# Patient Record
Sex: FEMALE | Race: WHITE | NOT HISPANIC OR LATINO
[De-identification: names, ages, dates, MRNs, and addresses within clinical notes are randomized per-mention and may not be internally consistent; named-entity substitution may affect disease eponyms.]

---

## 2017-01-13 ENCOUNTER — RECORD ABSTRACTING (OUTPATIENT)
Age: 27
End: 2017-01-13

## 2017-01-13 DIAGNOSIS — Z12.4 ENCOUNTER FOR SCREENING FOR MALIGNANT NEOPLASM OF CERVIX: ICD-10-CM

## 2017-01-13 DIAGNOSIS — Z98.891 HISTORY OF UTERINE SCAR FROM PREVIOUS SURGERY: ICD-10-CM

## 2017-01-13 DIAGNOSIS — Z78.9 OTHER SPECIFIED HEALTH STATUS: ICD-10-CM

## 2017-03-22 ENCOUNTER — RX RENEWAL (OUTPATIENT)
Age: 27
End: 2017-03-22

## 2017-03-27 ENCOUNTER — RESULT REVIEW (OUTPATIENT)
Age: 27
End: 2017-03-27

## 2017-03-27 ENCOUNTER — APPOINTMENT (OUTPATIENT)
Dept: OBGYN | Facility: CLINIC | Age: 27
End: 2017-03-27

## 2017-03-27 VITALS
WEIGHT: 152 LBS | DIASTOLIC BLOOD PRESSURE: 74 MMHG | HEIGHT: 65 IN | SYSTOLIC BLOOD PRESSURE: 110 MMHG | BODY MASS INDEX: 25.33 KG/M2

## 2017-04-11 ENCOUNTER — APPOINTMENT (OUTPATIENT)
Dept: ANTEPARTUM | Facility: CLINIC | Age: 27
End: 2017-04-11

## 2017-04-11 ENCOUNTER — RESULT REVIEW (OUTPATIENT)
Age: 27
End: 2017-04-11

## 2017-04-12 LAB
ABO + RH BLD: NORMAL
BASOPHILS # BLD: 0.02 TH/MM3
BASOPHILS NFR BLD: 0.2 %
BLD GP AB SCN SERPL QL: NEGATIVE
EOSINOPHIL # BLD: 0.1 TH/MM3
EOSINOPHIL NFR BLD: 1 %
ERYTHROCYTE [DISTWIDTH] IN BLOOD BY AUTOMATED COUNT: 14.9 %
GRANULOCYTES # BLD: 7.73 TH/MM3
GRANULOCYTES NFR BLD: 76.3 %
HCT VFR BLD AUTO: 38.9 %
HGB BLD-MCNC: 12.7 G/DL
IMM GRANULOCYTES # BLD: 0.02 TH/MM3
IMM GRANULOCYTES NFR BLD: 0.2 %
LYMPHOCYTES # BLD: 1.69 TH/MM3
LYMPHOCYTES NFR BLD: 16.7 %
MCH RBC QN AUTO: 29.4 PG
MCHC RBC AUTO-ENTMCNC: 32.6 G/DL
MCV RBC AUTO: 90 FL
MONOCYTES # BLD: 0.57 TH/MM3
MONOCYTES NFR BLD: 5.6 %
PLATELET # BLD: 273 TH/MM3
PMV BLD AUTO: 10.9 FL
RBC # BLD AUTO: 4.32 MIL/MM3
RPR SER QL: NONREACTIVE
WBC # BLD: 10.13 TH/MM3

## 2017-04-13 LAB
BACTERIA UR CULT: NORMAL
HBV SURFACE AG SER-ACNC: NONREACTIVE
RUBV IGG SER-ACNC: 3 INDEX
RUBV IGG SER-IMP: POSITIVE
VZV IGG FLD QL IA: 1224 INDEX
VZV IGG FLD QL IA: POSITIVE

## 2017-04-17 LAB
C TRACH RRNA SPEC QL NAA+PROBE: NOT DETECTED
HGB A MFR BLD: 96.7 %
HGB A2 MFR BLD: 3 %
HGB F MFR BLD: 0.3 %
HGB FRACT BLD ELPH CITRATE-IMP: NORMAL
N GONORRHOEA RRNA SPEC QL NAA+PROBE: NOT DETECTED

## 2017-04-19 ENCOUNTER — RESULT REVIEW (OUTPATIENT)
Age: 27
End: 2017-04-19

## 2017-04-19 LAB
AR GENE MUT ANL BLD/T: NORMAL
HIV1+2 AB SPEC QL IA.RAPID: NONREACTIVE
LEAD BLD-MCNC: <1 MCG/DL
SMN1(NORTH): NORMAL
SMN2(NORTH): NORMAL
SPECIMEN SOURCE: NORMAL
TECHNICAL RESULTS(NORTH): NORMAL

## 2017-04-27 ENCOUNTER — RESULT CHARGE (OUTPATIENT)
Age: 27
End: 2017-04-27

## 2017-04-27 ENCOUNTER — APPOINTMENT (OUTPATIENT)
Dept: OBGYN | Facility: CLINIC | Age: 27
End: 2017-04-27

## 2017-04-27 VITALS
BODY MASS INDEX: 26.66 KG/M2 | WEIGHT: 160 LBS | SYSTOLIC BLOOD PRESSURE: 118 MMHG | DIASTOLIC BLOOD PRESSURE: 60 MMHG | HEIGHT: 65 IN

## 2017-04-27 LAB
BILIRUB UR QL STRIP: NORMAL
CLARITY UR: CLEAR
COLLECTION METHOD: NORMAL
GLUCOSE UR-MCNC: NORMAL
HCG UR QL: NORMAL EU/DL
HGB UR QL STRIP.AUTO: NORMAL
KETONES UR-MCNC: NORMAL
LEUKOCYTE ESTERASE UR QL STRIP: NORMAL
NITRITE UR QL STRIP: NORMAL
PH UR STRIP: NORMAL
PROT UR STRIP-MCNC: NORMAL
SP GR UR STRIP: NORMAL

## 2017-05-01 ENCOUNTER — APPOINTMENT (OUTPATIENT)
Dept: OBGYN | Facility: CLINIC | Age: 27
End: 2017-05-01

## 2017-05-22 ENCOUNTER — APPOINTMENT (OUTPATIENT)
Dept: OBGYN | Facility: CLINIC | Age: 27
End: 2017-05-22

## 2017-05-22 VITALS — BODY MASS INDEX: 27.29 KG/M2 | WEIGHT: 164 LBS

## 2017-05-31 ENCOUNTER — RESULT REVIEW (OUTPATIENT)
Age: 27
End: 2017-05-31

## 2017-06-12 ENCOUNTER — APPOINTMENT (OUTPATIENT)
Dept: ANTEPARTUM | Facility: CLINIC | Age: 27
End: 2017-06-12

## 2017-06-14 LAB — MATERNAL ALPHA FETO (NORTH): NORMAL

## 2017-06-19 ENCOUNTER — APPOINTMENT (OUTPATIENT)
Dept: OBGYN | Facility: CLINIC | Age: 27
End: 2017-06-19

## 2017-06-19 ENCOUNTER — OUTPATIENT (OUTPATIENT)
Dept: OUTPATIENT SERVICES | Facility: HOSPITAL | Age: 27
LOS: 1 days | Discharge: HOME | End: 2017-06-19

## 2017-06-19 VITALS — DIASTOLIC BLOOD PRESSURE: 60 MMHG | WEIGHT: 169 LBS | SYSTOLIC BLOOD PRESSURE: 100 MMHG | BODY MASS INDEX: 28.12 KG/M2

## 2017-06-28 DIAGNOSIS — Z33.1 PREGNANT STATE, INCIDENTAL: ICD-10-CM

## 2017-07-17 ENCOUNTER — APPOINTMENT (OUTPATIENT)
Dept: OBGYN | Facility: CLINIC | Age: 27
End: 2017-07-17

## 2017-07-20 ENCOUNTER — APPOINTMENT (OUTPATIENT)
Dept: OBGYN | Facility: CLINIC | Age: 27
End: 2017-07-20

## 2017-07-20 ENCOUNTER — OUTPATIENT (OUTPATIENT)
Dept: OUTPATIENT SERVICES | Facility: HOSPITAL | Age: 27
LOS: 1 days | Discharge: HOME | End: 2017-07-20

## 2017-07-20 ENCOUNTER — RESULT CHARGE (OUTPATIENT)
Age: 27
End: 2017-07-20

## 2017-07-20 VITALS — WEIGHT: 174.5 LBS | BODY MASS INDEX: 29.04 KG/M2

## 2017-07-20 LAB
BILIRUB UR QL STRIP: NORMAL
CLARITY UR: CLEAR
COLLECTION METHOD: NORMAL
GLUCOSE UR-MCNC: NORMAL
HCG UR QL: 0.2 EU/DL
HGB UR QL STRIP.AUTO: NORMAL
KETONES UR-MCNC: NORMAL
LEUKOCYTE ESTERASE UR QL STRIP: NORMAL
NITRITE UR QL STRIP: NORMAL
PH UR STRIP: 6.5
PROT UR STRIP-MCNC: NORMAL
SP GR UR STRIP: 1.01

## 2017-08-03 DIAGNOSIS — Z33.1 PREGNANT STATE, INCIDENTAL: ICD-10-CM

## 2017-08-07 ENCOUNTER — RESULT REVIEW (OUTPATIENT)
Age: 27
End: 2017-08-07

## 2017-08-07 LAB
BASOPHILS # BLD: 0.01 TH/MM3
BASOPHILS NFR BLD: 0.1 %
DIFFERENTIAL METHOD BLD: NORMAL
EOSINOPHIL # BLD: 0.11 TH/MM3
EOSINOPHIL NFR BLD: 1.1 %
ERYTHROCYTE [DISTWIDTH] IN BLOOD BY AUTOMATED COUNT: 15 %
GLUCOSE 1H P GLC SERPL-MCNC: 86 MG/DL
GRANULOCYTES # BLD: 7.88 TH/MM3
GRANULOCYTES NFR BLD: 77.5 %
HCT VFR BLD AUTO: 37.3 %
HGB BLD-MCNC: 11.8 G/DL
IMM GRANULOCYTES # BLD: 0.09 TH/MM3
IMM GRANULOCYTES NFR BLD: 0.9 %
LYMPHOCYTES # BLD: 1.16 TH/MM3
LYMPHOCYTES NFR BLD: 11.4 %
MCH RBC QN AUTO: 30 PG
MCHC RBC AUTO-ENTMCNC: 31.6 G/DL
MCV RBC AUTO: 94.9 FL
MONOCYTES # BLD: 0.92 TH/MM3
MONOCYTES NFR BLD: 9 %
PLATELET # BLD: 240 TH/MM3
PMV BLD AUTO: 10.7 FL
RBC # BLD AUTO: 3.93 MIL/MM3
WBC # BLD: 10.17 TH/MM3

## 2017-08-28 ENCOUNTER — OUTPATIENT (OUTPATIENT)
Dept: OUTPATIENT SERVICES | Facility: HOSPITAL | Age: 27
LOS: 1 days | Discharge: HOME | End: 2017-08-28

## 2017-08-28 ENCOUNTER — APPOINTMENT (OUTPATIENT)
Dept: OBGYN | Facility: CLINIC | Age: 27
End: 2017-08-28

## 2017-08-28 ENCOUNTER — RESULT CHARGE (OUTPATIENT)
Age: 27
End: 2017-08-28

## 2017-08-28 VITALS
DIASTOLIC BLOOD PRESSURE: 60 MMHG | WEIGHT: 177.44 LBS | SYSTOLIC BLOOD PRESSURE: 100 MMHG | BODY MASS INDEX: 29.56 KG/M2 | HEIGHT: 65 IN

## 2017-08-28 LAB
BILIRUB UR QL STRIP: NORMAL
CLARITY UR: CLEAR
COLLECTION METHOD: NORMAL
GLUCOSE UR-MCNC: NORMAL
HCG UR QL: 0.2 EU/DL
HGB UR QL STRIP.AUTO: NORMAL
KETONES UR-MCNC: NORMAL
LEUKOCYTE ESTERASE UR QL STRIP: NORMAL
NITRITE UR QL STRIP: NORMAL
PH UR STRIP: 6
PROT UR STRIP-MCNC: NORMAL
SP GR UR STRIP: 1.02

## 2017-08-28 RX ORDER — VITAMIN A ACETATE, .BETA.-CAROTENE, ASCORBIC ACID, CHOLECALCIFEROL, .ALPHA.-TOCOPHEROL ACETATE, DL-, THIAMINE MONONITRATE, RIBOFLAVIN, NIACINAMIDE, PYRIDOXINE HYDROCHLORIDE, FOLIC ACID, CYANOCOBALAMIN, CALCIUM CARBONATE, FERROUS FUMARATE, ZINC OXIDE, CUPRIC OXIDE 3080; 920; 120; 400; 22; 1.84; 3; 20; 10; 1; 12; 200; 27; 25; 2 [IU]/1; [IU]/1; MG/1; [IU]/1; MG/1; MG/1; MG/1; MG/1; MG/1; MG/1; UG/1; MG/1; MG/1; MG/1; MG/1
27-1 TABLET, FILM COATED ORAL DAILY
Qty: 60 | Refills: 2 | Status: ACTIVE | COMMUNITY
Start: 2017-03-22 | End: 1900-01-01

## 2017-09-07 ENCOUNTER — APPOINTMENT (OUTPATIENT)
Dept: ANTEPARTUM | Facility: CLINIC | Age: 27
End: 2017-09-07

## 2017-09-07 DIAGNOSIS — Z3A.32 32 WEEKS GESTATION OF PREGNANCY: ICD-10-CM

## 2017-09-07 DIAGNOSIS — Z33.1 PREGNANT STATE, INCIDENTAL: ICD-10-CM

## 2017-09-07 DIAGNOSIS — O26.843 UTERINE SIZE-DATE DISCREPANCY, THIRD TRIMESTER: ICD-10-CM

## 2017-09-07 DIAGNOSIS — O34.219 MATERNAL CARE FOR UNSPECIFIED TYPE SCAR FROM PREVIOUS CESAREAN DELIVERY: ICD-10-CM

## 2017-09-11 ENCOUNTER — RESULT CHARGE (OUTPATIENT)
Age: 27
End: 2017-09-11

## 2017-09-11 ENCOUNTER — OUTPATIENT (OUTPATIENT)
Dept: OUTPATIENT SERVICES | Facility: HOSPITAL | Age: 27
LOS: 1 days | Discharge: HOME | End: 2017-09-11

## 2017-09-11 ENCOUNTER — APPOINTMENT (OUTPATIENT)
Dept: OBGYN | Facility: CLINIC | Age: 27
End: 2017-09-11

## 2017-09-11 VITALS — BODY MASS INDEX: 29.29 KG/M2 | WEIGHT: 176 LBS | DIASTOLIC BLOOD PRESSURE: 60 MMHG | SYSTOLIC BLOOD PRESSURE: 102 MMHG

## 2017-09-11 LAB
BILIRUB UR QL STRIP: NORMAL
CLARITY UR: NORMAL
COLLECTION METHOD: NORMAL
GLUCOSE UR-MCNC: NORMAL
HCG UR QL: 0.2 EU/DL
HGB UR QL STRIP.AUTO: NORMAL
KETONES UR-MCNC: NORMAL
LEUKOCYTE ESTERASE UR QL STRIP: NORMAL
NITRITE UR QL STRIP: NORMAL
PH UR STRIP: 6
PROT UR STRIP-MCNC: NORMAL
SP GR UR STRIP: 1.01

## 2017-09-25 ENCOUNTER — RESULT CHARGE (OUTPATIENT)
Age: 27
End: 2017-09-25

## 2017-09-25 ENCOUNTER — OUTPATIENT (OUTPATIENT)
Dept: OUTPATIENT SERVICES | Facility: HOSPITAL | Age: 27
LOS: 1 days | Discharge: HOME | End: 2017-09-25

## 2017-09-25 ENCOUNTER — RESULT REVIEW (OUTPATIENT)
Age: 27
End: 2017-09-25

## 2017-09-25 ENCOUNTER — APPOINTMENT (OUTPATIENT)
Dept: OBGYN | Facility: CLINIC | Age: 27
End: 2017-09-25

## 2017-09-25 VITALS
DIASTOLIC BLOOD PRESSURE: 62 MMHG | BODY MASS INDEX: 29.99 KG/M2 | SYSTOLIC BLOOD PRESSURE: 110 MMHG | HEIGHT: 65 IN | WEIGHT: 180.03 LBS

## 2017-09-25 LAB
BILIRUB UR QL STRIP: NORMAL
CLARITY UR: CLEAR
COLLECTION METHOD: NORMAL
GLUCOSE UR-MCNC: NORMAL
HCG UR QL: 0.2 EU/DL
HGB UR QL STRIP.AUTO: NORMAL
KETONES UR-MCNC: NORMAL
LEUKOCYTE ESTERASE UR QL STRIP: NORMAL
NITRITE UR QL STRIP: NORMAL
PH UR STRIP: 6
PROT UR STRIP-MCNC: NORMAL
SP GR UR STRIP: 1.01

## 2017-09-26 LAB
BASOPHILS # BLD: 0.01 TH/MM3
BASOPHILS NFR BLD: 0.1 %
DIFFERENTIAL METHOD BLD: NORMAL
EOSINOPHIL # BLD: 0.08 TH/MM3
EOSINOPHIL NFR BLD: 0.7 %
ERYTHROCYTE [DISTWIDTH] IN BLOOD BY AUTOMATED COUNT: 15.4 %
GRANULOCYTES # BLD: 9.14 TH/MM3
GRANULOCYTES NFR BLD: 78.1 %
HCT VFR BLD AUTO: 37.2 %
HGB BLD-MCNC: 12.1 G/DL
IMM GRANULOCYTES # BLD: 0.11 TH/MM3
IMM GRANULOCYTES NFR BLD: 0.9 %
LYMPHOCYTES # BLD: 1.5 TH/MM3
LYMPHOCYTES NFR BLD: 12.8 %
MCH RBC QN AUTO: 29.7 PG
MCHC RBC AUTO-ENTMCNC: 32.5 G/DL
MCV RBC AUTO: 91.2 FL
MONOCYTES # BLD: 0.87 TH/MM3
MONOCYTES NFR BLD: 7.4 %
PLATELET # BLD: 210 TH/MM3
PMV BLD AUTO: 10.7 FL
RBC # BLD AUTO: 4.08 MIL/MM3
WBC # BLD: 11.71 TH/MM3

## 2017-09-27 LAB — HIV1+2 AB SPEC QL IA.RAPID: NONREACTIVE

## 2017-09-28 LAB
GP B STREP DNA SPEC QL NAA+PROBE: NORMAL
S PYO DNA THROAT QL NAA+PROBE: NOT DETECTED
SPECIMEN SOURCE: NORMAL

## 2017-10-02 ENCOUNTER — OUTPATIENT (OUTPATIENT)
Dept: OUTPATIENT SERVICES | Facility: HOSPITAL | Age: 27
LOS: 1 days | Discharge: HOME | End: 2017-10-02

## 2017-10-02 ENCOUNTER — APPOINTMENT (OUTPATIENT)
Dept: OBGYN | Facility: CLINIC | Age: 27
End: 2017-10-02

## 2017-10-02 VITALS — SYSTOLIC BLOOD PRESSURE: 110 MMHG | DIASTOLIC BLOOD PRESSURE: 70 MMHG | BODY MASS INDEX: 30.29 KG/M2 | WEIGHT: 182 LBS

## 2017-10-02 LAB
BILIRUB UR QL STRIP: NEGATIVE
C TRACH RRNA SPEC QL NAA+PROBE: NOT DETECTED
CLARITY UR: CLEAR
COLLECTION METHOD: NORMAL
GLUCOSE UR-MCNC: NEGATIVE
HCG UR QL: NEGATIVE EU/DL
HGB UR QL STRIP.AUTO: NEGATIVE
KETONES UR-MCNC: NEGATIVE
LEUKOCYTE ESTERASE UR QL STRIP: NEGATIVE
N GONORRHOEA RRNA SPEC QL NAA+PROBE: NOT DETECTED
NITRITE UR QL STRIP: NEGATIVE
PH UR STRIP: 6.5
PROT UR STRIP-MCNC: NEGATIVE
SP GR UR STRIP: 1.02

## 2017-10-11 ENCOUNTER — APPOINTMENT (OUTPATIENT)
Dept: OBGYN | Facility: CLINIC | Age: 27
End: 2017-10-11

## 2017-10-11 ENCOUNTER — OUTPATIENT (OUTPATIENT)
Dept: OUTPATIENT SERVICES | Facility: HOSPITAL | Age: 27
LOS: 1 days | Discharge: HOME | End: 2017-10-11

## 2017-10-11 ENCOUNTER — RESULT CHARGE (OUTPATIENT)
Age: 27
End: 2017-10-11

## 2017-10-11 VITALS
WEIGHT: 179.25 LBS | SYSTOLIC BLOOD PRESSURE: 110 MMHG | DIASTOLIC BLOOD PRESSURE: 70 MMHG | BODY MASS INDEX: 29.87 KG/M2 | HEIGHT: 65 IN

## 2017-10-16 ENCOUNTER — APPOINTMENT (OUTPATIENT)
Dept: OBGYN | Facility: CLINIC | Age: 27
End: 2017-10-16

## 2017-10-16 ENCOUNTER — OUTPATIENT (OUTPATIENT)
Dept: OUTPATIENT SERVICES | Facility: HOSPITAL | Age: 27
LOS: 1 days | Discharge: HOME | End: 2017-10-16

## 2017-10-16 VITALS — DIASTOLIC BLOOD PRESSURE: 60 MMHG | SYSTOLIC BLOOD PRESSURE: 110 MMHG | BODY MASS INDEX: 29.79 KG/M2 | WEIGHT: 179 LBS

## 2017-10-16 LAB
BILIRUB UR QL STRIP: NEGATIVE
CLARITY UR: CLEAR
COLLECTION METHOD: NORMAL
GLUCOSE UR-MCNC: NEGATIVE
HCG UR QL: NEGATIVE EU/DL
HGB UR QL STRIP.AUTO: NEGATIVE
KETONES UR-MCNC: NEGATIVE
LEUKOCYTE ESTERASE UR QL STRIP: NORMAL
NITRITE UR QL STRIP: NEGATIVE
PH UR STRIP: 7
PROT UR STRIP-MCNC: NEGATIVE
SP GR UR STRIP: 1.02

## 2017-10-23 ENCOUNTER — APPOINTMENT (OUTPATIENT)
Dept: OBGYN | Facility: CLINIC | Age: 27
End: 2017-10-23

## 2017-10-23 ENCOUNTER — OUTPATIENT (OUTPATIENT)
Dept: OUTPATIENT SERVICES | Facility: HOSPITAL | Age: 27
LOS: 1 days | Discharge: HOME | End: 2017-10-23

## 2017-10-23 VITALS — WEIGHT: 180 LBS | DIASTOLIC BLOOD PRESSURE: 60 MMHG | SYSTOLIC BLOOD PRESSURE: 90 MMHG | BODY MASS INDEX: 29.95 KG/M2

## 2017-10-23 LAB
BILIRUB UR QL STRIP: NEGATIVE
CLARITY UR: CLEAR
COLLECTION METHOD: NORMAL
GLUCOSE UR-MCNC: NEGATIVE
HCG UR QL: NEGATIVE EU/DL
HGB UR QL STRIP.AUTO: NORMAL
KETONES UR-MCNC: NEGATIVE
LEUKOCYTE ESTERASE UR QL STRIP: NEGATIVE
NITRITE UR QL STRIP: NEGATIVE
PH UR STRIP: 6.5
PROT UR STRIP-MCNC: NEGATIVE
SP GR UR STRIP: 1.03

## 2017-10-30 ENCOUNTER — APPOINTMENT (OUTPATIENT)
Dept: OBGYN | Facility: CLINIC | Age: 27
End: 2017-10-30

## 2017-10-30 ENCOUNTER — OUTPATIENT (OUTPATIENT)
Dept: OUTPATIENT SERVICES | Facility: HOSPITAL | Age: 27
LOS: 1 days | Discharge: HOME | End: 2017-10-30

## 2017-10-30 ENCOUNTER — APPOINTMENT (OUTPATIENT)
Dept: ANTEPARTUM | Facility: CLINIC | Age: 27
End: 2017-10-30

## 2017-10-30 VITALS — SYSTOLIC BLOOD PRESSURE: 100 MMHG | WEIGHT: 180 LBS | BODY MASS INDEX: 29.95 KG/M2 | DIASTOLIC BLOOD PRESSURE: 60 MMHG

## 2017-10-30 DIAGNOSIS — Z33.1 PREGNANT STATE, INCIDENTAL: ICD-10-CM

## 2017-10-30 DIAGNOSIS — O48.0 POST-TERM PREGNANCY: ICD-10-CM

## 2017-11-02 ENCOUNTER — INPATIENT (INPATIENT)
Facility: HOSPITAL | Age: 27
LOS: 1 days | Discharge: HOME | End: 2017-11-04
Attending: OBSTETRICS & GYNECOLOGY | Admitting: OBSTETRICS & GYNECOLOGY

## 2017-11-06 ENCOUNTER — APPOINTMENT (OUTPATIENT)
Dept: ANTEPARTUM | Facility: CLINIC | Age: 27
End: 2017-11-06

## 2017-11-06 ENCOUNTER — APPOINTMENT (OUTPATIENT)
Dept: OBGYN | Facility: CLINIC | Age: 27
End: 2017-11-06

## 2017-11-08 DIAGNOSIS — O48.0 POST-TERM PREGNANCY: ICD-10-CM

## 2017-11-08 DIAGNOSIS — Z34.93 ENCOUNTER FOR SUPERVISION OF NORMAL PREGNANCY, UNSPECIFIED, THIRD TRIMESTER: ICD-10-CM

## 2017-11-08 DIAGNOSIS — B00.9 HERPESVIRAL INFECTION, UNSPECIFIED: ICD-10-CM

## 2017-11-08 DIAGNOSIS — Z3A.40 40 WEEKS GESTATION OF PREGNANCY: ICD-10-CM

## 2018-06-12 ENCOUNTER — TRANSCRIPTION ENCOUNTER (OUTPATIENT)
Age: 28
End: 2018-06-12

## 2018-08-09 ENCOUNTER — APPOINTMENT (OUTPATIENT)
Dept: OBGYN | Facility: CLINIC | Age: 28
End: 2018-08-09

## 2018-08-30 ENCOUNTER — APPOINTMENT (OUTPATIENT)
Dept: OBGYN | Facility: CLINIC | Age: 28
End: 2018-08-30

## 2018-08-30 ENCOUNTER — OUTPATIENT (OUTPATIENT)
Dept: OUTPATIENT SERVICES | Facility: HOSPITAL | Age: 28
LOS: 1 days | Discharge: HOME | End: 2018-08-30

## 2018-08-30 VITALS — DIASTOLIC BLOOD PRESSURE: 60 MMHG | SYSTOLIC BLOOD PRESSURE: 100 MMHG | WEIGHT: 170 LBS | BODY MASS INDEX: 28.29 KG/M2

## 2018-08-30 DIAGNOSIS — N83.209 UNSPECIFIED OVARIAN CYST, UNSPECIFIED SIDE: ICD-10-CM

## 2018-08-30 DIAGNOSIS — Z01.419 ENCOUNTER FOR GYNECOLOGICAL EXAMINATION (GENERAL) (ROUTINE) WITHOUT ABNORMAL FINDINGS: ICD-10-CM

## 2018-08-30 DIAGNOSIS — N92.6 IRREGULAR MENSTRUATION, UNSPECIFIED: ICD-10-CM

## 2018-09-14 ENCOUNTER — APPOINTMENT (OUTPATIENT)
Dept: ANTEPARTUM | Facility: CLINIC | Age: 28
End: 2018-09-14

## 2018-09-27 ENCOUNTER — APPOINTMENT (OUTPATIENT)
Dept: OBGYN | Facility: CLINIC | Age: 28
End: 2018-09-27

## 2018-09-27 ENCOUNTER — OUTPATIENT (OUTPATIENT)
Dept: OUTPATIENT SERVICES | Facility: HOSPITAL | Age: 28
LOS: 1 days | Discharge: HOME | End: 2018-09-27

## 2018-09-27 VITALS
HEIGHT: 65 IN | BODY MASS INDEX: 28.67 KG/M2 | SYSTOLIC BLOOD PRESSURE: 102 MMHG | WEIGHT: 172.06 LBS | DIASTOLIC BLOOD PRESSURE: 70 MMHG

## 2018-09-27 DIAGNOSIS — Z00.00 ENCOUNTER FOR GENERAL ADULT MEDICAL EXAMINATION WITHOUT ABNORMAL FINDINGS: ICD-10-CM

## 2018-09-28 DIAGNOSIS — N92.6 IRREGULAR MENSTRUATION, UNSPECIFIED: ICD-10-CM

## 2018-09-28 LAB
T4 FREE SERPL-MCNC: 1.1 NG/DL
TSH SERPL-ACNC: 1.9 UIU/ML

## 2018-10-15 ENCOUNTER — MESSAGE (OUTPATIENT)
Age: 28
End: 2018-10-15

## 2019-03-22 ENCOUNTER — TRANSCRIPTION ENCOUNTER (OUTPATIENT)
Age: 29
End: 2019-03-22

## 2019-08-12 ENCOUNTER — APPOINTMENT (OUTPATIENT)
Dept: OBGYN | Facility: CLINIC | Age: 29
End: 2019-08-12
Payer: OTHER GOVERNMENT

## 2019-08-12 ENCOUNTER — OUTPATIENT (OUTPATIENT)
Dept: OUTPATIENT SERVICES | Facility: HOSPITAL | Age: 29
LOS: 1 days | Discharge: HOME | End: 2019-08-12

## 2019-08-12 ENCOUNTER — RESULT CHARGE (OUTPATIENT)
Age: 29
End: 2019-08-12

## 2019-08-12 ENCOUNTER — NON-APPOINTMENT (OUTPATIENT)
Age: 29
End: 2019-08-12

## 2019-08-12 VITALS
DIASTOLIC BLOOD PRESSURE: 68 MMHG | HEIGHT: 65 IN | WEIGHT: 156 LBS | SYSTOLIC BLOOD PRESSURE: 100 MMHG | BODY MASS INDEX: 25.99 KG/M2

## 2019-08-12 DIAGNOSIS — Z86.69 PERSONAL HISTORY OF OTHER DISEASES OF THE NERVOUS SYSTEM AND SENSE ORGANS: ICD-10-CM

## 2019-08-12 PROCEDURE — 76815 OB US LIMITED FETUS(S): CPT | Mod: 26

## 2019-08-12 PROCEDURE — 0500F INITIAL PRENATAL CARE VISIT: CPT | Mod: 25

## 2019-08-12 RX ORDER — VITAMIN A, VITAMIN C, VITAMIN D-3, VITAMIN E, VITAMIN B-1, VITAMIN B-2, NIACIN, VITAMIN B-6, CALCIUM, IRON, ZINC, COPPER 4000; 120; 400; 22; 1.84; 3; 20; 10; 1; 12; 200; 27; 25; 2 [IU]/1; MG/1; [IU]/1; MG/1; MG/1; MG/1; MG/1; MG/1; MG/1; UG/1; MG/1; MG/1; MG/1; MG/1
27-1 TABLET ORAL DAILY
Qty: 30 | Refills: 5 | Status: ACTIVE | COMMUNITY
Start: 2019-08-12 | End: 1900-01-01

## 2019-08-12 RX ORDER — CHOLECALCIFEROL (VITAMIN D3) 25 MCG
27-0.8-228 TABLET,CHEWABLE ORAL
Qty: 90 | Refills: 3 | Status: DISCONTINUED | COMMUNITY
Start: 2017-08-28 | End: 2019-08-12

## 2019-08-12 RX ORDER — DESOGESTREL AND ETHINYL ESTRADIOL 0.15-0.03
0.15-3 KIT ORAL DAILY
Qty: 1 | Refills: 5 | Status: DISCONTINUED | COMMUNITY
Start: 2018-09-27 | End: 2019-08-12

## 2019-08-12 RX ORDER — VALACYCLOVIR 500 MG/1
500 TABLET, FILM COATED ORAL
Qty: 120 | Refills: 0 | Status: DISCONTINUED | COMMUNITY
Start: 2017-09-25 | End: 2019-08-12

## 2019-08-13 LAB
ABO + RH PNL BLD: NORMAL
BACTERIA UR CULT: NORMAL
BASOPHILS # BLD AUTO: 0.05 K/UL
BASOPHILS NFR BLD AUTO: 0.4 %
BLD GP AB SCN SERPL QL: NORMAL
C TRACH RRNA SPEC QL NAA+PROBE: NOT DETECTED
EOSINOPHIL # BLD AUTO: 0.11 K/UL
EOSINOPHIL NFR BLD AUTO: 0.9 %
HBV SURFACE AG SER QL: NONREACTIVE
HCT VFR BLD CALC: 36.8 %
HGB BLD-MCNC: 12.3 G/DL
HIV1+2 AB SPEC QL IA.RAPID: NONREACTIVE
IMM GRANULOCYTES NFR BLD AUTO: 0.6 %
LYMPHOCYTES # BLD AUTO: 1.67 K/UL
LYMPHOCYTES NFR BLD AUTO: 13.9 %
MAN DIFF?: NORMAL
MCHC RBC-ENTMCNC: 29.3 PG
MCHC RBC-ENTMCNC: 33.4 G/DL
MCV RBC AUTO: 87.6 FL
MEV IGG FLD QL IA: 220 AU/ML
MEV IGG+IGM SER-IMP: POSITIVE
MONOCYTES # BLD AUTO: 0.69 K/UL
MONOCYTES NFR BLD AUTO: 5.8 %
N GONORRHOEA RRNA SPEC QL NAA+PROBE: NOT DETECTED
NEUTROPHILS # BLD AUTO: 9.41 K/UL
NEUTROPHILS NFR BLD AUTO: 78.4 %
PLATELET # BLD AUTO: 277 K/UL
RBC # BLD: 4.2 M/UL
RBC # FLD: 14 %
RUBV IGG FLD-ACNC: 3.1 INDEX
RUBV IGG SER-IMP: POSITIVE
SOURCE AMPLIFICATION: NORMAL
T PALLIDUM AB SER QL IA: NEGATIVE
VZV AB TITR SER: POSITIVE
VZV IGG SER IF-ACNC: 893.1 INDEX
WBC # FLD AUTO: 12 K/UL

## 2019-08-14 DIAGNOSIS — Z34.90 ENCOUNTER FOR SUPERVISION OF NORMAL PREGNANCY, UNSPECIFIED, UNSPECIFIED TRIMESTER: ICD-10-CM

## 2019-08-14 LAB
HGB A MFR BLD: 97.2 %
HGB A2 MFR BLD: 2.8 %
HGB FRACT BLD-IMP: NORMAL
LEAD BLD-MCNC: <1 UG/DL

## 2019-08-15 LAB
FMR1 GENE MUT ANL BLD/T: NORMAL
HPV HIGH+LOW RISK DNA PNL CVX: NOT DETECTED

## 2019-08-19 LAB
AR GENE MUT ANL BLD/T: NEGATIVE
CFTR MUT TESTED BLD/T: NEGATIVE

## 2019-09-03 ENCOUNTER — LABORATORY RESULT (OUTPATIENT)
Age: 29
End: 2019-09-03

## 2019-09-04 ENCOUNTER — OUTPATIENT (OUTPATIENT)
Dept: OUTPATIENT SERVICES | Facility: HOSPITAL | Age: 29
LOS: 1 days | Discharge: HOME | End: 2019-09-04

## 2019-09-04 ENCOUNTER — APPOINTMENT (OUTPATIENT)
Dept: ANTEPARTUM | Facility: CLINIC | Age: 29
End: 2019-09-04
Payer: OTHER GOVERNMENT

## 2019-09-04 PROCEDURE — 99211 OFF/OP EST MAY X REQ PHY/QHP: CPT | Mod: 25

## 2019-09-04 PROCEDURE — 76805 OB US >/= 14 WKS SNGL FETUS: CPT | Mod: 26

## 2019-09-05 DIAGNOSIS — Z13.79 ENCOUNTER FOR OTHER SCREENING FOR GENETIC AND CHROMOSOMAL ANOMALIES: ICD-10-CM

## 2019-09-05 DIAGNOSIS — Z36.82 ENCOUNTER FOR ANTENATAL SCREENING FOR NUCHAL TRANSLUCENCY: ICD-10-CM

## 2019-09-06 DIAGNOSIS — Z34.90 ENCOUNTER FOR SUPERVISION OF NORMAL PREGNANCY, UNSPECIFIED, UNSPECIFIED TRIMESTER: ICD-10-CM

## 2019-09-06 DIAGNOSIS — Z36.84 ENCOUNTER FOR ANTENATAL SCREENING FOR FETAL LUNG MATURITY: ICD-10-CM

## 2019-09-06 DIAGNOSIS — Z36.3 ENCOUNTER FOR ANTENATAL SCREENING FOR MALFORMATIONS: ICD-10-CM

## 2019-09-06 DIAGNOSIS — Z3A.13 13 WEEKS GESTATION OF PREGNANCY: ICD-10-CM

## 2019-09-12 ENCOUNTER — RESULT CHARGE (OUTPATIENT)
Age: 29
End: 2019-09-12

## 2019-09-12 ENCOUNTER — NON-APPOINTMENT (OUTPATIENT)
Age: 29
End: 2019-09-12

## 2019-09-12 ENCOUNTER — APPOINTMENT (OUTPATIENT)
Dept: OBGYN | Facility: CLINIC | Age: 29
End: 2019-09-12
Payer: OTHER GOVERNMENT

## 2019-09-12 ENCOUNTER — OUTPATIENT (OUTPATIENT)
Dept: OUTPATIENT SERVICES | Facility: HOSPITAL | Age: 29
LOS: 1 days | Discharge: HOME | End: 2019-09-12

## 2019-09-12 VITALS
BODY MASS INDEX: 26.49 KG/M2 | DIASTOLIC BLOOD PRESSURE: 64 MMHG | SYSTOLIC BLOOD PRESSURE: 100 MMHG | WEIGHT: 159 LBS | HEIGHT: 65 IN

## 2019-09-12 PROCEDURE — 0502F SUBSEQUENT PRENATAL CARE: CPT

## 2019-09-13 DIAGNOSIS — Z34.90 ENCOUNTER FOR SUPERVISION OF NORMAL PREGNANCY, UNSPECIFIED, UNSPECIFIED TRIMESTER: ICD-10-CM

## 2019-10-03 ENCOUNTER — LABORATORY RESULT (OUTPATIENT)
Age: 29
End: 2019-10-03

## 2019-10-10 ENCOUNTER — APPOINTMENT (OUTPATIENT)
Dept: OBGYN | Facility: CLINIC | Age: 29
End: 2019-10-10
Payer: OTHER GOVERNMENT

## 2019-10-10 ENCOUNTER — OUTPATIENT (OUTPATIENT)
Dept: OUTPATIENT SERVICES | Facility: HOSPITAL | Age: 29
LOS: 1 days | Discharge: HOME | End: 2019-10-10

## 2019-10-10 ENCOUNTER — RESULT CHARGE (OUTPATIENT)
Age: 29
End: 2019-10-10

## 2019-10-10 ENCOUNTER — NON-APPOINTMENT (OUTPATIENT)
Age: 29
End: 2019-10-10

## 2019-10-10 VITALS — BODY MASS INDEX: 27.46 KG/M2 | SYSTOLIC BLOOD PRESSURE: 100 MMHG | WEIGHT: 165 LBS | DIASTOLIC BLOOD PRESSURE: 60 MMHG

## 2019-10-10 DIAGNOSIS — Z01.419 ENCOUNTER FOR GYNECOLOGICAL EXAMINATION (GENERAL) (ROUTINE) W/OUT ABNORMAL FINDINGS: ICD-10-CM

## 2019-10-10 PROCEDURE — 0502F SUBSEQUENT PRENATAL CARE: CPT

## 2019-10-15 DIAGNOSIS — Z01.419 ENCOUNTER FOR GYNECOLOGICAL EXAMINATION (GENERAL) (ROUTINE) WITHOUT ABNORMAL FINDINGS: ICD-10-CM

## 2019-10-23 ENCOUNTER — APPOINTMENT (OUTPATIENT)
Dept: ANTEPARTUM | Facility: CLINIC | Age: 29
End: 2019-10-23
Payer: OTHER GOVERNMENT

## 2019-10-23 ENCOUNTER — RESULT REVIEW (OUTPATIENT)
Age: 29
End: 2019-10-23

## 2019-10-23 ENCOUNTER — ASOB RESULT (OUTPATIENT)
Age: 29
End: 2019-10-23

## 2019-10-23 ENCOUNTER — OUTPATIENT (OUTPATIENT)
Dept: OUTPATIENT SERVICES | Facility: HOSPITAL | Age: 29
LOS: 1 days | Discharge: HOME | End: 2019-10-23

## 2019-10-23 PROCEDURE — 76811 OB US DETAILED SNGL FETUS: CPT | Mod: 26

## 2019-10-24 DIAGNOSIS — Z3A.20 20 WEEKS GESTATION OF PREGNANCY: ICD-10-CM

## 2019-10-24 DIAGNOSIS — Z36.89 ENCOUNTER FOR OTHER SPECIFIED ANTENATAL SCREENING: ICD-10-CM

## 2019-10-24 DIAGNOSIS — O35.9XX1 MATERNAL CARE FOR (SUSPECTED) FETAL ABNORMALITY AND DAMAGE, UNSPECIFIED, FETUS 1: ICD-10-CM

## 2019-10-24 DIAGNOSIS — Z36.3 ENCOUNTER FOR ANTENATAL SCREENING FOR MALFORMATIONS: ICD-10-CM

## 2019-11-07 ENCOUNTER — NON-APPOINTMENT (OUTPATIENT)
Age: 29
End: 2019-11-07

## 2019-11-07 ENCOUNTER — RESULT CHARGE (OUTPATIENT)
Age: 29
End: 2019-11-07

## 2019-11-07 ENCOUNTER — OUTPATIENT (OUTPATIENT)
Dept: OUTPATIENT SERVICES | Facility: HOSPITAL | Age: 29
LOS: 1 days | Discharge: HOME | End: 2019-11-07

## 2019-11-07 ENCOUNTER — APPOINTMENT (OUTPATIENT)
Dept: OBGYN | Facility: CLINIC | Age: 29
End: 2019-11-07
Payer: OTHER GOVERNMENT

## 2019-11-07 VITALS — WEIGHT: 174 LBS | BODY MASS INDEX: 28.96 KG/M2 | SYSTOLIC BLOOD PRESSURE: 100 MMHG | DIASTOLIC BLOOD PRESSURE: 60 MMHG

## 2019-11-07 LAB
BILIRUB UR QL STRIP: NORMAL
CLARITY UR: CLEAR
COLLECTION METHOD: NORMAL
GLUCOSE UR-MCNC: NORMAL
HCG UR QL: 0.2 EU/DL
HGB UR QL STRIP.AUTO: NORMAL
KETONES UR-MCNC: NORMAL
LEUKOCYTE ESTERASE UR QL STRIP: NORMAL
NITRITE UR QL STRIP: NORMAL
PH UR STRIP: 6.5
PROT UR STRIP-MCNC: NORMAL
SP GR UR STRIP: 1

## 2019-11-07 PROCEDURE — 99213 OFFICE O/P EST LOW 20 MIN: CPT

## 2019-11-08 ENCOUNTER — APPOINTMENT (OUTPATIENT)
Dept: ANTEPARTUM | Facility: CLINIC | Age: 29
End: 2019-11-08
Payer: OTHER GOVERNMENT

## 2019-11-08 ENCOUNTER — OUTPATIENT (OUTPATIENT)
Dept: OUTPATIENT SERVICES | Facility: HOSPITAL | Age: 29
LOS: 1 days | Discharge: HOME | End: 2019-11-08

## 2019-11-08 ENCOUNTER — ASOB RESULT (OUTPATIENT)
Age: 29
End: 2019-11-08

## 2019-11-08 PROCEDURE — 76815 OB US LIMITED FETUS(S): CPT | Mod: 26

## 2019-11-13 DIAGNOSIS — Z34.90 ENCOUNTER FOR SUPERVISION OF NORMAL PREGNANCY, UNSPECIFIED, UNSPECIFIED TRIMESTER: ICD-10-CM

## 2019-11-14 DIAGNOSIS — O35.9XX1 MATERNAL CARE FOR (SUSPECTED) FETAL ABNORMALITY AND DAMAGE, UNSPECIFIED, FETUS 1: ICD-10-CM

## 2019-11-14 DIAGNOSIS — Z3A.22 22 WEEKS GESTATION OF PREGNANCY: ICD-10-CM

## 2019-11-14 DIAGNOSIS — Z04.89 ENCOUNTER FOR EXAMINATION AND OBSERVATION FOR OTHER SPECIFIED REASONS: ICD-10-CM

## 2019-12-09 ENCOUNTER — APPOINTMENT (OUTPATIENT)
Dept: OBGYN | Facility: CLINIC | Age: 29
End: 2019-12-09
Payer: OTHER GOVERNMENT

## 2019-12-09 ENCOUNTER — OUTPATIENT (OUTPATIENT)
Dept: OUTPATIENT SERVICES | Facility: HOSPITAL | Age: 29
LOS: 1 days | Discharge: HOME | End: 2019-12-09

## 2019-12-09 ENCOUNTER — NON-APPOINTMENT (OUTPATIENT)
Age: 29
End: 2019-12-09

## 2019-12-09 ENCOUNTER — RESULT CHARGE (OUTPATIENT)
Age: 29
End: 2019-12-09

## 2019-12-09 VITALS
DIASTOLIC BLOOD PRESSURE: 72 MMHG | WEIGHT: 180.13 LBS | SYSTOLIC BLOOD PRESSURE: 118 MMHG | BODY MASS INDEX: 29.97 KG/M2

## 2019-12-09 LAB
BILIRUB UR QL STRIP: NEGATIVE
CLARITY UR: CLEAR
COLLECTION METHOD: NORMAL
GLUCOSE UR-MCNC: NEGATIVE
HCG UR QL: NEGATIVE EU/DL
HGB UR QL STRIP.AUTO: NEGATIVE
KETONES UR-MCNC: NEGATIVE
LEUKOCYTE ESTERASE UR QL STRIP: NEGATIVE
NITRITE UR QL STRIP: NEGATIVE
PH UR STRIP: 6
PROT UR STRIP-MCNC: NEGATIVE
SP GR UR STRIP: 1.02

## 2019-12-09 PROCEDURE — 0502F SUBSEQUENT PRENATAL CARE: CPT

## 2019-12-10 LAB
BASOPHILS # BLD AUTO: 0.04 K/UL
BASOPHILS NFR BLD AUTO: 0.3 %
EOSINOPHIL # BLD AUTO: 0.08 K/UL
EOSINOPHIL NFR BLD AUTO: 0.6 %
GLUCOSE 1H P 50 G GLC PO SERPL-MCNC: 101 MG/DL
HCT VFR BLD CALC: 34 %
HGB BLD-MCNC: 11 G/DL
IMM GRANULOCYTES NFR BLD AUTO: 2.3 %
LYMPHOCYTES # BLD AUTO: 1.49 K/UL
LYMPHOCYTES NFR BLD AUTO: 11.6 %
MAN DIFF?: NORMAL
MCHC RBC-ENTMCNC: 30.4 PG
MCHC RBC-ENTMCNC: 32.4 G/DL
MCV RBC AUTO: 93.9 FL
MONOCYTES # BLD AUTO: 0.83 K/UL
MONOCYTES NFR BLD AUTO: 6.5 %
NEUTROPHILS # BLD AUTO: 10.07 K/UL
NEUTROPHILS NFR BLD AUTO: 78.7 %
PLATELET # BLD AUTO: 207 K/UL
RBC # BLD: 3.62 M/UL
RBC # FLD: 14 %
WBC # FLD AUTO: 12.8 K/UL

## 2019-12-11 DIAGNOSIS — Z34.90 ENCOUNTER FOR SUPERVISION OF NORMAL PREGNANCY, UNSPECIFIED, UNSPECIFIED TRIMESTER: ICD-10-CM

## 2020-01-06 ENCOUNTER — NON-APPOINTMENT (OUTPATIENT)
Age: 30
End: 2020-01-06

## 2020-01-06 ENCOUNTER — APPOINTMENT (OUTPATIENT)
Dept: OBGYN | Facility: CLINIC | Age: 30
End: 2020-01-06
Payer: OTHER GOVERNMENT

## 2020-01-06 ENCOUNTER — OUTPATIENT (OUTPATIENT)
Dept: OUTPATIENT SERVICES | Facility: HOSPITAL | Age: 30
LOS: 1 days | Discharge: HOME | End: 2020-01-06

## 2020-01-06 VITALS — DIASTOLIC BLOOD PRESSURE: 64 MMHG | SYSTOLIC BLOOD PRESSURE: 110 MMHG

## 2020-01-06 PROCEDURE — 0502F SUBSEQUENT PRENATAL CARE: CPT

## 2020-01-10 DIAGNOSIS — Z34.90 ENCOUNTER FOR SUPERVISION OF NORMAL PREGNANCY, UNSPECIFIED, UNSPECIFIED TRIMESTER: ICD-10-CM

## 2020-01-23 ENCOUNTER — APPOINTMENT (OUTPATIENT)
Dept: ANTEPARTUM | Facility: CLINIC | Age: 30
End: 2020-01-23
Payer: OTHER GOVERNMENT

## 2020-01-23 ENCOUNTER — ASOB RESULT (OUTPATIENT)
Age: 30
End: 2020-01-23

## 2020-01-23 ENCOUNTER — OUTPATIENT (OUTPATIENT)
Dept: OUTPATIENT SERVICES | Facility: HOSPITAL | Age: 30
LOS: 1 days | Discharge: HOME | End: 2020-01-23

## 2020-01-23 PROCEDURE — 76816 OB US FOLLOW-UP PER FETUS: CPT | Mod: 26

## 2020-01-23 PROCEDURE — 76819 FETAL BIOPHYS PROFIL W/O NST: CPT | Mod: 26,59

## 2020-01-27 ENCOUNTER — NON-APPOINTMENT (OUTPATIENT)
Age: 30
End: 2020-01-27

## 2020-01-27 ENCOUNTER — RESULT CHARGE (OUTPATIENT)
Age: 30
End: 2020-01-27

## 2020-01-27 ENCOUNTER — APPOINTMENT (OUTPATIENT)
Dept: OBGYN | Facility: CLINIC | Age: 30
End: 2020-01-27
Payer: OTHER GOVERNMENT

## 2020-01-27 ENCOUNTER — OUTPATIENT (OUTPATIENT)
Dept: OUTPATIENT SERVICES | Facility: HOSPITAL | Age: 30
LOS: 1 days | Discharge: HOME | End: 2020-01-27

## 2020-01-27 VITALS — WEIGHT: 191 LBS | DIASTOLIC BLOOD PRESSURE: 60 MMHG | SYSTOLIC BLOOD PRESSURE: 110 MMHG | BODY MASS INDEX: 31.78 KG/M2

## 2020-01-27 DIAGNOSIS — O40.9XX0 POLYHYDRAMNIOS, UNSPECIFIED TRIMESTER, NOT APPLICABLE OR UNSPECIFIED: ICD-10-CM

## 2020-01-27 DIAGNOSIS — Z36.89 ENCOUNTER FOR OTHER SPECIFIED ANTENATAL SCREENING: ICD-10-CM

## 2020-01-27 DIAGNOSIS — Z3A.33 33 WEEKS GESTATION OF PREGNANCY: ICD-10-CM

## 2020-01-27 PROCEDURE — 0502F SUBSEQUENT PRENATAL CARE: CPT

## 2020-01-28 DIAGNOSIS — Z34.90 ENCOUNTER FOR SUPERVISION OF NORMAL PREGNANCY, UNSPECIFIED, UNSPECIFIED TRIMESTER: ICD-10-CM

## 2020-01-29 ENCOUNTER — ASOB RESULT (OUTPATIENT)
Age: 30
End: 2020-01-29

## 2020-01-29 ENCOUNTER — APPOINTMENT (OUTPATIENT)
Dept: ANTEPARTUM | Facility: CLINIC | Age: 30
End: 2020-01-29
Payer: OTHER GOVERNMENT

## 2020-01-29 ENCOUNTER — OUTPATIENT (OUTPATIENT)
Dept: OUTPATIENT SERVICES | Facility: HOSPITAL | Age: 30
LOS: 1 days | Discharge: HOME | End: 2020-01-29

## 2020-01-29 DIAGNOSIS — O40.3XX0 POLYHYDRAMNIOS, THIRD TRIMESTER, NOT APPLICABLE OR UNSPECIFIED: ICD-10-CM

## 2020-01-29 DIAGNOSIS — Z3A.34 34 WEEKS GESTATION OF PREGNANCY: ICD-10-CM

## 2020-01-29 DIAGNOSIS — O99.210 OBESITY COMPLICATING PREGNANCY, UNSPECIFIED TRIMESTER: ICD-10-CM

## 2020-01-29 PROCEDURE — 76819 FETAL BIOPHYS PROFIL W/O NST: CPT | Mod: 26

## 2020-02-05 ENCOUNTER — APPOINTMENT (OUTPATIENT)
Dept: ANTEPARTUM | Facility: CLINIC | Age: 30
End: 2020-02-05
Payer: OTHER GOVERNMENT

## 2020-02-05 ENCOUNTER — ASOB RESULT (OUTPATIENT)
Age: 30
End: 2020-02-05

## 2020-02-05 ENCOUNTER — OUTPATIENT (OUTPATIENT)
Dept: OUTPATIENT SERVICES | Facility: HOSPITAL | Age: 30
LOS: 1 days | Discharge: HOME | End: 2020-02-05

## 2020-02-05 PROCEDURE — 76819 FETAL BIOPHYS PROFIL W/O NST: CPT | Mod: 26

## 2020-02-06 DIAGNOSIS — O40.3XX0 POLYHYDRAMNIOS, THIRD TRIMESTER, NOT APPLICABLE OR UNSPECIFIED: ICD-10-CM

## 2020-02-06 DIAGNOSIS — Z3A.35 35 WEEKS GESTATION OF PREGNANCY: ICD-10-CM

## 2020-02-06 DIAGNOSIS — O99.210 OBESITY COMPLICATING PREGNANCY, UNSPECIFIED TRIMESTER: ICD-10-CM

## 2020-02-10 ENCOUNTER — RESULT CHARGE (OUTPATIENT)
Age: 30
End: 2020-02-10

## 2020-02-10 ENCOUNTER — APPOINTMENT (OUTPATIENT)
Dept: OBGYN | Facility: CLINIC | Age: 30
End: 2020-02-10
Payer: OTHER GOVERNMENT

## 2020-02-10 ENCOUNTER — NON-APPOINTMENT (OUTPATIENT)
Age: 30
End: 2020-02-10

## 2020-02-10 ENCOUNTER — OUTPATIENT (OUTPATIENT)
Dept: OUTPATIENT SERVICES | Facility: HOSPITAL | Age: 30
LOS: 1 days | Discharge: HOME | End: 2020-02-10

## 2020-02-10 VITALS — WEIGHT: 192 LBS | DIASTOLIC BLOOD PRESSURE: 70 MMHG | SYSTOLIC BLOOD PRESSURE: 110 MMHG | BODY MASS INDEX: 31.95 KG/M2

## 2020-02-10 LAB
BASOPHILS # BLD AUTO: 0.03 K/UL
BASOPHILS NFR BLD AUTO: 0.3 %
BILIRUB UR QL STRIP: NEGATIVE
CLARITY UR: CLEAR
COLLECTION METHOD: NORMAL
EOSINOPHIL # BLD AUTO: 0.07 K/UL
EOSINOPHIL NFR BLD AUTO: 0.6 %
GLUCOSE UR-MCNC: NEGATIVE
HCG UR QL: NEGATIVE EU/DL
HCT VFR BLD CALC: 37 %
HGB BLD-MCNC: 12 G/DL
HGB UR QL STRIP.AUTO: NEGATIVE
IMM GRANULOCYTES NFR BLD AUTO: 1.8 %
KETONES UR-MCNC: NEGATIVE
LEUKOCYTE ESTERASE UR QL STRIP: NEGATIVE
LYMPHOCYTES # BLD AUTO: 1.28 K/UL
LYMPHOCYTES NFR BLD AUTO: 11.7 %
MAN DIFF?: NORMAL
MCHC RBC-ENTMCNC: 29.6 PG
MCHC RBC-ENTMCNC: 32.4 G/DL
MCV RBC AUTO: 91.4 FL
MONOCYTES # BLD AUTO: 0.79 K/UL
MONOCYTES NFR BLD AUTO: 7.2 %
NEUTROPHILS # BLD AUTO: 8.58 K/UL
NEUTROPHILS NFR BLD AUTO: 78.4 %
NITRITE UR QL STRIP: NEGATIVE
PH UR STRIP: 6
PLATELET # BLD AUTO: 210 K/UL
PROT UR STRIP-MCNC: NEGATIVE
RBC # BLD: 4.05 M/UL
RBC # FLD: 14 %
SP GR UR STRIP: 1.02
WBC # FLD AUTO: 10.95 K/UL

## 2020-02-10 PROCEDURE — 0502F SUBSEQUENT PRENATAL CARE: CPT

## 2020-02-11 LAB
C TRACH RRNA SPEC QL NAA+PROBE: NOT DETECTED
HIV1+2 AB SPEC QL IA.RAPID: NONREACTIVE
N GONORRHOEA RRNA SPEC QL NAA+PROBE: NOT DETECTED
SOURCE AMPLIFICATION: NORMAL
T PALLIDUM AB SER QL IA: NEGATIVE

## 2020-02-12 ENCOUNTER — APPOINTMENT (OUTPATIENT)
Dept: ANTEPARTUM | Facility: CLINIC | Age: 30
End: 2020-02-12
Payer: OTHER GOVERNMENT

## 2020-02-12 ENCOUNTER — OUTPATIENT (OUTPATIENT)
Dept: OUTPATIENT SERVICES | Facility: HOSPITAL | Age: 30
LOS: 1 days | Discharge: HOME | End: 2020-02-12

## 2020-02-12 ENCOUNTER — ASOB RESULT (OUTPATIENT)
Age: 30
End: 2020-02-12

## 2020-02-12 LAB
GP B STREP DNA SPEC QL NAA+PROBE: DETECTED
GP B STREP DNA SPEC QL NAA+PROBE: NORMAL
SOURCE GBS: NORMAL

## 2020-02-12 PROCEDURE — 76819 FETAL BIOPHYS PROFIL W/O NST: CPT | Mod: 26

## 2020-02-13 DIAGNOSIS — Z34.90 ENCOUNTER FOR SUPERVISION OF NORMAL PREGNANCY, UNSPECIFIED, UNSPECIFIED TRIMESTER: ICD-10-CM

## 2020-02-17 ENCOUNTER — OUTPATIENT (OUTPATIENT)
Dept: OUTPATIENT SERVICES | Facility: HOSPITAL | Age: 30
LOS: 1 days | Discharge: HOME | End: 2020-02-17
Payer: OTHER GOVERNMENT

## 2020-02-17 VITALS
HEART RATE: 81 BPM | DIASTOLIC BLOOD PRESSURE: 85 MMHG | TEMPERATURE: 99 F | RESPIRATION RATE: 16 BRPM | SYSTOLIC BLOOD PRESSURE: 132 MMHG

## 2020-02-17 VITALS — SYSTOLIC BLOOD PRESSURE: 132 MMHG | DIASTOLIC BLOOD PRESSURE: 85 MMHG | HEART RATE: 81 BPM

## 2020-02-17 DIAGNOSIS — N81.0 URETHROCELE: Chronic | ICD-10-CM

## 2020-02-17 DIAGNOSIS — B00.9 HERPESVIRAL INFECTION, UNSPECIFIED: ICD-10-CM

## 2020-02-17 DIAGNOSIS — Z98.891 HISTORY OF UTERINE SCAR FROM PREVIOUS SURGERY: Chronic | ICD-10-CM

## 2020-02-17 PROCEDURE — 76818 FETAL BIOPHYS PROFILE W/NST: CPT | Mod: 26

## 2020-02-17 PROCEDURE — 99282 EMERGENCY DEPT VISIT SF MDM: CPT | Mod: 25

## 2020-02-17 RX ORDER — VALACYCLOVIR 500 MG/1
500 TABLET, FILM COATED ORAL TWICE DAILY
Qty: 40 | Refills: 0 | Status: ACTIVE | COMMUNITY
Start: 2020-02-17 | End: 1900-01-01

## 2020-02-17 NOTE — OB PROVIDER TRIAGE NOTE - NS_OBGYNHISTORY_OBGYN_ALL_OB_FT
Ob hx: , FT LTCS x1 for IUGR, then VBACx3 largest 8lb    Gyn hx: h/o gentival HSV last outbreak years ago, denies h/o cysts, fibroids, abnormal paps, other STIs Ob hx: , FT LTCS x1 for IUGR, then VBACx3 largest 8lb    Gyn hx: h/o genital HSV last outbreak years ago, denies h/o cysts, fibroids, abnormal paps, other STIs.

## 2020-02-17 NOTE — CHART NOTE - NSCHARTNOTEFT_GEN_A_CORE
Biophysical Profile    Amniotic fluid: pocket >2cm two planes  Fetal movement: observed  Fetal Breathing: observed  Fetal Tone: observed    NST    Baseline: 130  Variability: moderate  Accelerations: present  Decelerations: none    [place]: q2-4min    Cat I - reactive    Score: 10/10

## 2020-02-17 NOTE — OB PROVIDER TRIAGE NOTE - NSOBPROVIDERNOTE_OBGYN_ALL_OB_FT
30 yo  @37w1d, GBS pos, h/o c/sx1 and successful VBACx3 w/ plan for , h/o HSV no lesions and has not started suppression, mild polyhydramnios, not ruptured, reassuring fetal and maternal status.    - d/c to home  - f/up at next scheduled appointment   - labor precautions  - [facility] instructions, maternal hydration encouraged  - begin valtrex for HSV suppression    Dr. Cannon and Dr. Costa aware.

## 2020-02-17 NOTE — OB PROVIDER TRIAGE NOTE - NSHPLABSRESULTS_GEN_ALL_CORE
Labs:  2/10/20  GBS: pos  GC/CT: neg  HIV: NR    12/9/19  GCT: 101    8/12/20  blood type: B pos  Ab screen: neg  HepB: neg  HIV: NR  RPR: neg  varicella: immune  rubella: immune  measles: immune  lead: <1  hgn electrophoresis: nml  CF: neg  SMA: neg  fragile x: neg    seq1/2: low risk    Sonos:  13w3d: SIUP, +FHR, nt 1.51wnl, 2.5cm left cystic mass  20w3d: EFW 361gms (52%), ceph, post placenta, MVP 5.28cm, cvx 4.7cm, nml anatomy but limited   22w5d: breech, ant placenta, nml anatomy  33w4d: EFW 2663gms (89%), ceph, post placenta, MVP 9.42cm, BPP 8/8  34w3d: ceph, post placenta, MVP 6cm, BPP 8/8  35w3d: ceph, post placenta, MVP 8.2cm, BPP 8/8  36w3d: ceph, post placenta, MVP 8.24cm, BPP 8/8

## 2020-02-17 NOTE — OB PROVIDER TRIAGE NOTE - ADDITIONAL INSTRUCTIONS
- f/up at next scheduled appointment 2/19  - labor precautions  - [facility] instructions, maternal hydration encouraged  - begin valtrex for HSV suppression

## 2020-02-17 NOTE — OB PROVIDER TRIAGE NOTE - HISTORY OF PRESENT ILLNESS
30 yo  @37w1d w/ EDC 3/8/20 by LMP c/w 1st tri sono presents to L&D with complaint of leakage of fluid 1hr ago, clear. Pt was in the shower just prior to this and felt a small bulge in her vagina which she still feels. Reports some irregular minimally painful ctx for the past several days, 1-2/10 intensity. Denies VB. Good fetal movements. H/o genital HSV yrs ago, has not yet started valtrex. Denies active lesions or prodromal symptoms. H/o c/s with successful VBACx3, with plan for . Otherwise no complications during this pregnancy. GBS pos.

## 2020-02-17 NOTE — OB PROVIDER TRIAGE NOTE - NSHPPHYSICALEXAM_GEN_ALL_CORE
Vital Signs Last 24 Hrs  T(F): 98.7 (17 Feb 2020 21:50), Max: 98.7 (17 Feb 2020 21:50)  HR: 81 (17 Feb 2020 21:53) (81 - 81)  BP: 132/85 (17 Feb 2020 21:53) (132/85 - 132/85)  RR: 16 (17 Feb 2020 21:50) (16 - 16)    gen: AAOx3, nad  EFM: 130/mod rodolfo/+accel  toco: q4-5min  speculum: neg pooling, neg nitrazine, neg ferning, small rectocele, no lesions on speculum  SVE: 1/50/-3, vtx, intact  BSS: vtx, post placenta, MVP 9.1cm, BPP 8/8  abd: soft, nontender, gravid, mildly palpable contractions, no incisional tenderness

## 2020-02-18 DIAGNOSIS — O40.3XX0 POLYHYDRAMNIOS, THIRD TRIMESTER, NOT APPLICABLE OR UNSPECIFIED: ICD-10-CM

## 2020-02-18 DIAGNOSIS — O99.210 OBESITY COMPLICATING PREGNANCY, UNSPECIFIED TRIMESTER: ICD-10-CM

## 2020-02-18 DIAGNOSIS — Z3A.36 36 WEEKS GESTATION OF PREGNANCY: ICD-10-CM

## 2020-02-19 ENCOUNTER — ASOB RESULT (OUTPATIENT)
Age: 30
End: 2020-02-19

## 2020-02-19 ENCOUNTER — APPOINTMENT (OUTPATIENT)
Dept: ANTEPARTUM | Facility: CLINIC | Age: 30
End: 2020-02-19
Payer: OTHER GOVERNMENT

## 2020-02-19 ENCOUNTER — APPOINTMENT (OUTPATIENT)
Dept: OBGYN | Facility: CLINIC | Age: 30
End: 2020-02-19
Payer: OTHER GOVERNMENT

## 2020-02-19 ENCOUNTER — RESULT CHARGE (OUTPATIENT)
Age: 30
End: 2020-02-19

## 2020-02-19 ENCOUNTER — OUTPATIENT (OUTPATIENT)
Dept: OUTPATIENT SERVICES | Facility: HOSPITAL | Age: 30
LOS: 1 days | Discharge: HOME | End: 2020-02-19

## 2020-02-19 ENCOUNTER — NON-APPOINTMENT (OUTPATIENT)
Age: 30
End: 2020-02-19

## 2020-02-19 ENCOUNTER — INPATIENT (INPATIENT)
Facility: HOSPITAL | Age: 30
LOS: 0 days | Discharge: HOME | End: 2020-02-19
Attending: OBSTETRICS & GYNECOLOGY | Admitting: OBSTETRICS & GYNECOLOGY

## 2020-02-19 VITALS — HEART RATE: 106 BPM | DIASTOLIC BLOOD PRESSURE: 64 MMHG | SYSTOLIC BLOOD PRESSURE: 115 MMHG

## 2020-02-19 VITALS — WEIGHT: 188 LBS | DIASTOLIC BLOOD PRESSURE: 74 MMHG | SYSTOLIC BLOOD PRESSURE: 110 MMHG | BODY MASS INDEX: 31.29 KG/M2

## 2020-02-19 VITALS
RESPIRATION RATE: 16 BRPM | WEIGHT: 188.05 LBS | DIASTOLIC BLOOD PRESSURE: 64 MMHG | HEART RATE: 106 BPM | SYSTOLIC BLOOD PRESSURE: 115 MMHG | TEMPERATURE: 99 F | HEIGHT: 65 IN

## 2020-02-19 DIAGNOSIS — N81.0 URETHROCELE: Chronic | ICD-10-CM

## 2020-02-19 DIAGNOSIS — Z98.891 HISTORY OF UTERINE SCAR FROM PREVIOUS SURGERY: Chronic | ICD-10-CM

## 2020-02-19 PROBLEM — A60.00 HERPESVIRAL INFECTION OF UROGENITAL SYSTEM, UNSPECIFIED: Chronic | Status: ACTIVE | Noted: 2020-02-17

## 2020-02-19 PROBLEM — E28.2 POLYCYSTIC OVARIAN SYNDROME: Chronic | Status: ACTIVE | Noted: 2020-02-17

## 2020-02-19 LAB
BILIRUB UR QL STRIP: NORMAL
BLD GP AB SCN SERPL QL: SIGNIFICANT CHANGE UP
CLARITY UR: CLEAR
COLLECTION METHOD: NORMAL
GLUCOSE UR-MCNC: NORMAL
HCG UR QL: NORMAL EU/DL
HGB UR QL STRIP.AUTO: NORMAL
KETONES UR-MCNC: NORMAL
LEUKOCYTE ESTERASE UR QL STRIP: NORMAL
NITRITE UR QL STRIP: NORMAL
PH UR STRIP: 5
PROT UR STRIP-MCNC: NORMAL
SP GR UR STRIP: 1.01

## 2020-02-19 PROCEDURE — 0502F SUBSEQUENT PRENATAL CARE: CPT

## 2020-02-19 PROCEDURE — 76818 FETAL BIOPHYS PROFILE W/NST: CPT | Mod: 26

## 2020-02-19 NOTE — DISCHARGE NOTE OB - CARE PLAN
Principal Discharge DX:	Polyhydramnios  Goal:	pregnancy  Assessment and plan of treatment:	Follow up with Dr. Culp at next scheduled appointment in 1 week. If you have contractions, LOF, or vaginal bleeding, call your doctor and return to L&D.

## 2020-02-19 NOTE — OB PROVIDER H&P - NS_OBGYNHISTORY_OBGYN_ALL_OB_FT
ObHx: , FT LTCS x1 for IUGR, then VBACx3 largest 8lb  GynHx: h/o genital HSV last outbreak 2 years ago, denies h/o cysts, fibroids, abnormal paps, other STIs

## 2020-02-19 NOTE — DISCHARGE NOTE OB - CARE PROVIDER_API CALL
Kelly Culp)  Obstetrics and Gynecology  [address] [place] [id number]  Phone: [phone]  Fax: [phone]  Follow Up Time:

## 2020-02-19 NOTE — OB PROVIDER H&P - ASSESSMENT
Dr. Cortez at bedside for discussion with patient on plan. Explained to patient the options given Cat 1 tracing after prolonged monitoring @ <39 weeks GA    1) delivery by  section  Not recommended given patients motivation for  and h/o previous  x 2.     2) delivery by TOLAC  Recommended but only at 39 weeks, or otherwise indicated by other medical condition, as the fetal heart tracing is reassuring and patient's vaginal exam is unfavorable at this time.  Risks of uterine rupture, PPH, fetal risks prior to 39 weeks, failed IOL explained to patient.    3) repeat BPP/NST in 48 hours   Recommended to patient due to reassuring fetal and maternal well being    R/B/A explained.  All questions answered.  patient and  decided on expectant management with repeat BPP/NST in 48 hours, if normal will follow up for routine prenatal visit in 1 week with Dr. Kelly Culp. A/P: 30 yo  @37w3d, GBS pos, h/o c/sx1 and successful VBACx3 w/ plan for , h/o HSV on valtrex, mild polyhydramnios, reassuring fetal and maternal status.  - Dr. Cortez at bedside for discussion with patient on plan. Explained to patient the options given Cat 1 tracing after prolonged monitoring @ <39 weeks GA  1) delivery by  section  Not recommended given patients motivation for  and h/o previous  x 2.   2) delivery by TOLAC  Recommended but only at 39 weeks, or otherwise indicated by other medical condition, as the fetal heart tracing is reassuring and patient's vaginal exam is unfavorable at this time.  Risks of uterine rupture, PPH, fetal risks prior to 39 weeks, failed IOL explained to patient.  3) repeat BPP/NST in 48 hours   Recommended to patient due to reassuring fetal and maternal well being  R/B/A explained.  All questions answered.  patient and  decided on expectant management with repeat BPP/NST in 48 hours, if normal will follow up for routine prenatal visit in 1 week with Dr. Kelly Culp.  - discharge home    Dr. Martins and Dr. Cortez aware.

## 2020-02-19 NOTE — OB PROVIDER H&P - HISTORY OF PRESENT ILLNESS
30yo  at 37w3d EDC 3/8/20 by LMP c/w 1st tri sono sent from PMD office for evaluation. Patient had polyhydramnios on sono today, MVP 8.86cm, also found to have 3min decel, BPP 8/10. Reports occasional contractions, denies VB or LOF. Good fetal movements. H/o genital HSV, last outbreak 2 years ago, currently on Valtrex. Denies any active lesions or prodromal symptoms. H/o c/s with successful VBACx3, with plan for . Otherwise no complications during this pregnancy. GBS pos.

## 2020-02-19 NOTE — DISCHARGE NOTE OB - HOSPITAL COURSE
Patient send from PMD office for possible induction, found to have category 1 tracing. Patient offered induction however opted to be discharged home and follow up with PMD per routine.

## 2020-02-19 NOTE — DISCHARGE NOTE OB - PATIENT PORTAL LINK FT
You can access the FollowMyHealth Patient Portal offered by [facility] by registering at the following website: [email or web address]. By joining [facility]’s FollowMyHealth portal, you will also be able to view your health information using other applications (apps) compatible with our system.

## 2020-02-19 NOTE — DISCHARGE NOTE OB - ADDITIONAL INSTRUCTIONS
Follow up with Dr. Culp at next scheduled appointment in 1 week. If you have contractions, LOF, or vaginal bleeding, call your doctor and return to L&D. Return to Labor and Delivery in 48 hours for monitoring. Follow up with Dr. Culp at next scheduled appointment in 1 week. If you have contractions, LOF, or vaginal bleeding, call your doctor and return to L&D.

## 2020-02-19 NOTE — DISCHARGE NOTE OB - PLAN OF CARE
pregnancy Follow up with Dr. Culp at next scheduled appointment in 1 week. If you have contractions, LOF, or vaginal bleeding, call your doctor and return to L&D.

## 2020-02-19 NOTE — OB PROVIDER H&P - NSHPPHYSICALEXAM_GEN_ALL_CORE
Vital Signs Last 24 Hrs  T(F): 99 (19 Feb 2020 14:25), Max: 99 (19 Feb 2020 14:25)  HR: 106 (19 Feb 2020 14:25) (106 - 106)  BP: 115/64 (19 Feb 2020 14:25) (115/64 - 115/64)  RR: 16 (19 Feb 2020 14:25) (16 - 16)    Gen: AOx3, no acute distress  Abd: soft, gravid, non tender, no palpable contractions, no incisional tenderness  Ext: No edema bilaterally      EFM: 135/mod/+accels; cat 1  [place]: none  SVE: 1/L/-3  Speculum: no lesions on speculum

## 2020-02-20 DIAGNOSIS — Z34.90 ENCOUNTER FOR SUPERVISION OF NORMAL PREGNANCY, UNSPECIFIED, UNSPECIFIED TRIMESTER: ICD-10-CM

## 2020-02-20 DIAGNOSIS — Z02.9 ENCOUNTER FOR ADMINISTRATIVE EXAMINATIONS, UNSPECIFIED: ICD-10-CM

## 2020-02-21 ENCOUNTER — OUTPATIENT (OUTPATIENT)
Dept: OUTPATIENT SERVICES | Facility: HOSPITAL | Age: 30
LOS: 1 days | Discharge: HOME | End: 2020-02-21
Payer: OTHER GOVERNMENT

## 2020-02-21 VITALS — HEART RATE: 97 BPM | TEMPERATURE: 99 F | SYSTOLIC BLOOD PRESSURE: 111 MMHG | DIASTOLIC BLOOD PRESSURE: 69 MMHG

## 2020-02-21 DIAGNOSIS — Z98.891 HISTORY OF UTERINE SCAR FROM PREVIOUS SURGERY: Chronic | ICD-10-CM

## 2020-02-21 DIAGNOSIS — N81.0 URETHROCELE: Chronic | ICD-10-CM

## 2020-02-21 PROCEDURE — 99282 EMERGENCY DEPT VISIT SF MDM: CPT | Mod: 25

## 2020-02-21 PROCEDURE — 59025 FETAL NON-STRESS TEST: CPT | Mod: 26

## 2020-02-21 PROCEDURE — 76815 OB US LIMITED FETUS(S): CPT | Mod: 26

## 2020-02-21 NOTE — OB PROVIDER TRIAGE NOTE - NSGESTATIONAGE4_OBGYN_ALL_OB_NU
Spoke with [facility] pharmacy, they needed ICD code for Vyvanse, informed F90.1.   
[facility] requesting to speak with nurse regarding patient's medication.  Please advise.  
40

## 2020-02-21 NOTE — OB PROVIDER TRIAGE NOTE - ADDITIONAL INSTRUCTIONS
Follow up with Dr. Culp at next scheduled appointment. If you have contractions, leaking of fluid, vaginal bleeding, or don't feel baby move as much, call your doctor or return to labor and delivery.

## 2020-02-21 NOTE — OB PROVIDER TRIAGE NOTE - NSOBPROVIDERNOTE_OBGYN_ALL_OB_FT
A/P: 28 yo  @37w5d, GBS pos, h/o c/sx1 and successful VBACx3 w/ plan for , h/o HSV on valtrex, mild polyhydramnios, with reassuring fetal and maternal status, BPP 10/10  - discharge home  - follow up with Dr. Culp at next appointment on   - labor precautions  - [facility] instructions    Dr. Porras and Dr. Harper aware. A/P: 28 yo  @37w5d, GBS pos, h/o c/sx1 and successful VBACx3 w/ plan for , h/o HSV on valtrex, mild polyhydramnios, with reassuring fetal and maternal status, BPP 10/10  - discharge home  - follow up with Dr. Culp at next appointment on   - labor precautions  - [facility] instructions    Dr. Porras and Dr. Harper aware.    OB ATTENDING: Patient seen and examined, agree with resident note. Patient with polyhydramnios, here for fetal monitoring. NST reactive and BPP 10/10. Follow up outpatient. -L. BAHOUTH

## 2020-02-21 NOTE — OB PROVIDER TRIAGE NOTE - NSHPPHYSICALEXAM_GEN_ALL_CORE
Vital Signs Last 24 Hrs  T(F): 98.6 (21 Feb 2020 14:16), Max: 98.6 (21 Feb 2020 14:16)  HR: 97 (21 Feb 2020 14:16) (97 - 97)  BP: 111/69 (21 Feb 2020 14:16) (111/69 - 111/69)    Gen: AOx3, no acute distress  Abd: soft, gravid, non tender, no palpable contractions  Ext: No edema bilaterally    EFM: 125/mod/+accels; cat 1  [place]: none  SVE: deferred Vital Signs Last 24 Hrs  T(F): 98.6 (21 Feb 2020 14:16), Max: 98.6 (21 Feb 2020 14:16)  HR: 97 (21 Feb 2020 14:16) (97 - 97)  BP: 111/69 (21 Feb 2020 14:16) (111/69 - 111/69)    Gen: AOx3, no acute distress  Abd: soft, gravid, non tender, no palpable contractions  Ext: No edema bilaterally    EFM: 125/mod/+accels; cat 1  [place]: none  SVE: deferred    BSS: cephalic, posterior placenta, MVP 8.92cm, BPP 10/10

## 2020-02-24 ENCOUNTER — RESULT CHARGE (OUTPATIENT)
Age: 30
End: 2020-02-24

## 2020-02-24 ENCOUNTER — APPOINTMENT (OUTPATIENT)
Dept: OBGYN | Facility: CLINIC | Age: 30
End: 2020-02-24
Payer: OTHER GOVERNMENT

## 2020-02-24 ENCOUNTER — OUTPATIENT (OUTPATIENT)
Dept: OUTPATIENT SERVICES | Facility: HOSPITAL | Age: 30
LOS: 1 days | Discharge: HOME | End: 2020-02-24

## 2020-02-24 ENCOUNTER — NON-APPOINTMENT (OUTPATIENT)
Age: 30
End: 2020-02-24

## 2020-02-24 VITALS — BODY MASS INDEX: 32.12 KG/M2 | DIASTOLIC BLOOD PRESSURE: 60 MMHG | WEIGHT: 193 LBS | SYSTOLIC BLOOD PRESSURE: 110 MMHG

## 2020-02-24 DIAGNOSIS — O99.210 OBESITY COMPLICATING PREGNANCY, UNSPECIFIED TRIMESTER: ICD-10-CM

## 2020-02-24 DIAGNOSIS — O40.3XX0 POLYHYDRAMNIOS, THIRD TRIMESTER, NOT APPLICABLE OR UNSPECIFIED: ICD-10-CM

## 2020-02-24 DIAGNOSIS — O98.313 OTHER INFECTIONS WITH A PREDOMINANTLY SEXUAL MODE OF TRANSMISSION COMPLICATING PREGNANCY, THIRD TRIMESTER: ICD-10-CM

## 2020-02-24 DIAGNOSIS — Z3A.37 37 WEEKS GESTATION OF PREGNANCY: ICD-10-CM

## 2020-02-24 DIAGNOSIS — O36.8390 MATERNAL CARE FOR ABNORMALITIES OF THE FETAL HEART RATE OR RHYTHM, UNSPECIFIED TRIMESTER, NOT APPLICABLE OR UNSPECIFIED: ICD-10-CM

## 2020-02-24 DIAGNOSIS — O99.283 ENDOCRINE, NUTRITIONAL AND METABOLIC DISEASES COMPLICATING PREGNANCY, THIRD TRIMESTER: ICD-10-CM

## 2020-02-24 DIAGNOSIS — N81.0 URETHROCELE: Chronic | ICD-10-CM

## 2020-02-24 DIAGNOSIS — A60.00 HERPESVIRAL INFECTION OF UROGENITAL SYSTEM, UNSPECIFIED: ICD-10-CM

## 2020-02-24 DIAGNOSIS — O99.820 STREPTOCOCCUS B CARRIER STATE COMPLICATING PREGNANCY: ICD-10-CM

## 2020-02-24 DIAGNOSIS — E28.2 POLYCYSTIC OVARIAN SYNDROME: ICD-10-CM

## 2020-02-24 DIAGNOSIS — Z98.891 HISTORY OF UTERINE SCAR FROM PREVIOUS SURGERY: Chronic | ICD-10-CM

## 2020-02-24 PROCEDURE — 0502F SUBSEQUENT PRENATAL CARE: CPT

## 2020-02-25 DIAGNOSIS — Z34.90 ENCOUNTER FOR SUPERVISION OF NORMAL PREGNANCY, UNSPECIFIED, UNSPECIFIED TRIMESTER: ICD-10-CM

## 2020-02-26 ENCOUNTER — APPOINTMENT (OUTPATIENT)
Dept: ANTEPARTUM | Facility: CLINIC | Age: 30
End: 2020-02-26
Payer: OTHER GOVERNMENT

## 2020-02-26 ENCOUNTER — ASOB RESULT (OUTPATIENT)
Age: 30
End: 2020-02-26

## 2020-02-26 ENCOUNTER — OUTPATIENT (OUTPATIENT)
Dept: OUTPATIENT SERVICES | Facility: HOSPITAL | Age: 30
LOS: 1 days | Discharge: HOME | End: 2020-02-26

## 2020-02-26 DIAGNOSIS — Z98.891 HISTORY OF UTERINE SCAR FROM PREVIOUS SURGERY: Chronic | ICD-10-CM

## 2020-02-26 DIAGNOSIS — N81.0 URETHROCELE: Chronic | ICD-10-CM

## 2020-02-26 PROCEDURE — 76819 FETAL BIOPHYS PROFIL W/O NST: CPT | Mod: 26

## 2020-03-02 ENCOUNTER — NON-APPOINTMENT (OUTPATIENT)
Age: 30
End: 2020-03-02

## 2020-03-02 ENCOUNTER — RESULT CHARGE (OUTPATIENT)
Age: 30
End: 2020-03-02

## 2020-03-02 ENCOUNTER — APPOINTMENT (OUTPATIENT)
Dept: OBGYN | Facility: CLINIC | Age: 30
End: 2020-03-02
Payer: OTHER GOVERNMENT

## 2020-03-02 ENCOUNTER — OUTPATIENT (OUTPATIENT)
Dept: OUTPATIENT SERVICES | Facility: HOSPITAL | Age: 30
LOS: 1 days | Discharge: HOME | End: 2020-03-02

## 2020-03-02 VITALS
BODY MASS INDEX: 32.15 KG/M2 | WEIGHT: 193 LBS | DIASTOLIC BLOOD PRESSURE: 72 MMHG | HEIGHT: 65 IN | SYSTOLIC BLOOD PRESSURE: 118 MMHG

## 2020-03-02 DIAGNOSIS — N81.0 URETHROCELE: Chronic | ICD-10-CM

## 2020-03-02 DIAGNOSIS — Z98.891 HISTORY OF UTERINE SCAR FROM PREVIOUS SURGERY: Chronic | ICD-10-CM

## 2020-03-02 PROCEDURE — 0502F SUBSEQUENT PRENATAL CARE: CPT

## 2020-03-04 ENCOUNTER — OUTPATIENT (OUTPATIENT)
Dept: OUTPATIENT SERVICES | Facility: HOSPITAL | Age: 30
LOS: 1 days | Discharge: HOME | End: 2020-03-04

## 2020-03-04 ENCOUNTER — APPOINTMENT (OUTPATIENT)
Dept: ANTEPARTUM | Facility: CLINIC | Age: 30
End: 2020-03-04
Payer: OTHER GOVERNMENT

## 2020-03-04 ENCOUNTER — ASOB RESULT (OUTPATIENT)
Age: 30
End: 2020-03-04

## 2020-03-04 DIAGNOSIS — Z98.891 HISTORY OF UTERINE SCAR FROM PREVIOUS SURGERY: Chronic | ICD-10-CM

## 2020-03-04 DIAGNOSIS — N81.0 URETHROCELE: Chronic | ICD-10-CM

## 2020-03-04 PROCEDURE — 76819 FETAL BIOPHYS PROFIL W/O NST: CPT | Mod: 26

## 2020-03-04 PROCEDURE — 76816 OB US FOLLOW-UP PER FETUS: CPT | Mod: 26

## 2020-03-05 DIAGNOSIS — Z3A.39 39 WEEKS GESTATION OF PREGNANCY: ICD-10-CM

## 2020-03-05 DIAGNOSIS — Z36.89 ENCOUNTER FOR OTHER SPECIFIED ANTENATAL SCREENING: ICD-10-CM

## 2020-03-05 DIAGNOSIS — O99.210 OBESITY COMPLICATING PREGNANCY, UNSPECIFIED TRIMESTER: ICD-10-CM

## 2020-03-09 ENCOUNTER — OUTPATIENT (OUTPATIENT)
Dept: OUTPATIENT SERVICES | Facility: HOSPITAL | Age: 30
LOS: 1 days | Discharge: HOME | End: 2020-03-09

## 2020-03-09 ENCOUNTER — NON-APPOINTMENT (OUTPATIENT)
Age: 30
End: 2020-03-09

## 2020-03-09 ENCOUNTER — RESULT CHARGE (OUTPATIENT)
Age: 30
End: 2020-03-09

## 2020-03-09 ENCOUNTER — APPOINTMENT (OUTPATIENT)
Dept: OBGYN | Facility: CLINIC | Age: 30
End: 2020-03-09
Payer: OTHER GOVERNMENT

## 2020-03-09 VITALS — WEIGHT: 193 LBS | DIASTOLIC BLOOD PRESSURE: 78 MMHG | BODY MASS INDEX: 32.12 KG/M2 | SYSTOLIC BLOOD PRESSURE: 112 MMHG

## 2020-03-09 DIAGNOSIS — N81.0 URETHROCELE: Chronic | ICD-10-CM

## 2020-03-09 DIAGNOSIS — Z98.891 HISTORY OF UTERINE SCAR FROM PREVIOUS SURGERY: Chronic | ICD-10-CM

## 2020-03-09 LAB
BILIRUB UR QL STRIP: NEGATIVE
BILIRUB UR QL STRIP: NORMAL
CLARITY UR: CLEAR
CLARITY UR: CLEAR
COLLECTION METHOD: NORMAL
COLLECTION METHOD: NORMAL
GLUCOSE UR-MCNC: NEGATIVE
GLUCOSE UR-MCNC: NORMAL
HCG UR QL: 0.2 EU/DL
HCG UR QL: NEGATIVE EU/DL
HGB UR QL STRIP.AUTO: NEGATIVE
HGB UR QL STRIP.AUTO: NORMAL
KETONES UR-MCNC: NORMAL
KETONES UR-MCNC: NORMAL
LEUKOCYTE ESTERASE UR QL STRIP: NEGATIVE
LEUKOCYTE ESTERASE UR QL STRIP: NORMAL
NITRITE UR QL STRIP: NEGATIVE
NITRITE UR QL STRIP: NORMAL
PH UR STRIP: 6
PH UR STRIP: 6
PROT UR STRIP-MCNC: NEGATIVE
PROT UR STRIP-MCNC: NORMAL
SP GR UR STRIP: 1.01
SP GR UR STRIP: 1.02

## 2020-03-09 PROCEDURE — 0502F SUBSEQUENT PRENATAL CARE: CPT

## 2020-03-10 ENCOUNTER — INPATIENT (INPATIENT)
Facility: HOSPITAL | Age: 30
LOS: 0 days | Discharge: HOME | End: 2020-03-11
Attending: OBSTETRICS & GYNECOLOGY | Admitting: OBSTETRICS & GYNECOLOGY
Payer: OTHER GOVERNMENT

## 2020-03-10 VITALS
HEIGHT: 65 IN | DIASTOLIC BLOOD PRESSURE: 77 MMHG | RESPIRATION RATE: 16 BRPM | HEART RATE: 88 BPM | WEIGHT: 192.9 LBS | TEMPERATURE: 98 F | SYSTOLIC BLOOD PRESSURE: 119 MMHG

## 2020-03-10 DIAGNOSIS — N81.0 URETHROCELE: Chronic | ICD-10-CM

## 2020-03-10 DIAGNOSIS — Z98.891 HISTORY OF UTERINE SCAR FROM PREVIOUS SURGERY: Chronic | ICD-10-CM

## 2020-03-10 DIAGNOSIS — Z02.9 ENCOUNTER FOR ADMINISTRATIVE EXAMINATIONS, UNSPECIFIED: ICD-10-CM

## 2020-03-10 DIAGNOSIS — Z34.90 ENCOUNTER FOR SUPERVISION OF NORMAL PREGNANCY, UNSPECIFIED, UNSPECIFIED TRIMESTER: ICD-10-CM

## 2020-03-10 LAB
AMPHET UR-MCNC: NEGATIVE — SIGNIFICANT CHANGE UP
APPEARANCE UR: ABNORMAL
BACTERIA # UR AUTO: ABNORMAL
BARBITURATES UR SCN-MCNC: NEGATIVE — SIGNIFICANT CHANGE UP
BASOPHILS # BLD AUTO: 0.05 K/UL — SIGNIFICANT CHANGE UP (ref 0–0.2)
BASOPHILS NFR BLD AUTO: 0.4 % — SIGNIFICANT CHANGE UP (ref 0–1)
BENZODIAZ UR-MCNC: NEGATIVE — SIGNIFICANT CHANGE UP
BILIRUB UR-MCNC: NEGATIVE — SIGNIFICANT CHANGE UP
BLD GP AB SCN SERPL QL: SIGNIFICANT CHANGE UP
BUPRENORPHINE SCREEN, URINE RESULT: NEGATIVE — SIGNIFICANT CHANGE UP
COCAINE METAB.OTHER UR-MCNC: NEGATIVE — SIGNIFICANT CHANGE UP
COLOR SPEC: SIGNIFICANT CHANGE UP
DIFF PNL FLD: NEGATIVE — SIGNIFICANT CHANGE UP
EOSINOPHIL # BLD AUTO: 0.12 K/UL — SIGNIFICANT CHANGE UP (ref 0–0.7)
EOSINOPHIL NFR BLD AUTO: 0.9 % — SIGNIFICANT CHANGE UP (ref 0–8)
EPI CELLS # UR: 4 /HPF — SIGNIFICANT CHANGE UP (ref 0–5)
FENTANYL UR QL: NEGATIVE — SIGNIFICANT CHANGE UP
GLUCOSE UR QL: NEGATIVE — SIGNIFICANT CHANGE UP
HCT VFR BLD CALC: 35.2 % — LOW (ref 37–47)
HGB BLD-MCNC: 11.9 G/DL — LOW (ref 12–16)
HYALINE CASTS # UR AUTO: 3 /LPF — SIGNIFICANT CHANGE UP (ref 0–7)
IMM GRANULOCYTES NFR BLD AUTO: 2.2 % — HIGH (ref 0.1–0.3)
KETONES UR-MCNC: NEGATIVE — SIGNIFICANT CHANGE UP
L&D DRUG SCREEN, URINE: SIGNIFICANT CHANGE UP
LEUKOCYTE ESTERASE UR-ACNC: ABNORMAL
LYMPHOCYTES # BLD AUTO: 1.71 K/UL — SIGNIFICANT CHANGE UP (ref 1.2–3.4)
LYMPHOCYTES # BLD AUTO: 13.2 % — LOW (ref 20.5–51.1)
MCHC RBC-ENTMCNC: 30.6 PG — SIGNIFICANT CHANGE UP (ref 27–31)
MCHC RBC-ENTMCNC: 33.8 G/DL — SIGNIFICANT CHANGE UP (ref 32–37)
MCV RBC AUTO: 90.5 FL — SIGNIFICANT CHANGE UP (ref 81–99)
METHADONE UR-MCNC: NEGATIVE — SIGNIFICANT CHANGE UP
MONOCYTES # BLD AUTO: 1.04 K/UL — HIGH (ref 0.1–0.6)
MONOCYTES NFR BLD AUTO: 8 % — SIGNIFICANT CHANGE UP (ref 1.7–9.3)
NEUTROPHILS # BLD AUTO: 9.74 K/UL — HIGH (ref 1.4–6.5)
NEUTROPHILS NFR BLD AUTO: 75.3 % — HIGH (ref 42.2–75.2)
NITRITE UR-MCNC: NEGATIVE — SIGNIFICANT CHANGE UP
NRBC # BLD: 0 /100 WBCS — SIGNIFICANT CHANGE UP (ref 0–0)
OPIATES UR-MCNC: NEGATIVE — SIGNIFICANT CHANGE UP
OXYCODONE UR-MCNC: NEGATIVE — SIGNIFICANT CHANGE UP
PCP UR-MCNC: NEGATIVE — SIGNIFICANT CHANGE UP
PH UR: 6.5 — SIGNIFICANT CHANGE UP (ref 5–8)
PLATELET # BLD AUTO: 239 K/UL — SIGNIFICANT CHANGE UP (ref 130–400)
PRENATAL SYPHILIS TEST: SIGNIFICANT CHANGE UP
PROPOXYPHENE QUALITATIVE URINE RESULT: NEGATIVE — SIGNIFICANT CHANGE UP
PROT UR-MCNC: NEGATIVE — SIGNIFICANT CHANGE UP
RBC # BLD: 3.89 M/UL — LOW (ref 4.2–5.4)
RBC # FLD: 14.1 % — SIGNIFICANT CHANGE UP (ref 11.5–14.5)
RBC CASTS # UR COMP ASSIST: 4 /HPF — SIGNIFICANT CHANGE UP (ref 0–4)
SP GR SPEC: 1.01 — SIGNIFICANT CHANGE UP (ref 1.01–1.02)
UROBILINOGEN FLD QL: SIGNIFICANT CHANGE UP
WBC # BLD: 12.94 K/UL — HIGH (ref 4.8–10.8)
WBC # FLD AUTO: 12.94 K/UL — HIGH (ref 4.8–10.8)
WBC UR QL: 12 /HPF — HIGH (ref 0–5)

## 2020-03-10 PROCEDURE — 59400 OBSTETRICAL CARE: CPT

## 2020-03-10 RX ORDER — OXYTOCIN 10 UNIT/ML
333.33 VIAL (ML) INJECTION
Qty: 20 | Refills: 0 | Status: DISCONTINUED | OUTPATIENT
Start: 2020-03-10 | End: 2020-03-11

## 2020-03-10 RX ORDER — SIMETHICONE 80 MG/1
80 TABLET, CHEWABLE ORAL EVERY 4 HOURS
Refills: 0 | Status: DISCONTINUED | OUTPATIENT
Start: 2020-03-10 | End: 2020-03-11

## 2020-03-10 RX ORDER — LANOLIN
1 OINTMENT (GRAM) TOPICAL EVERY 6 HOURS
Refills: 0 | Status: DISCONTINUED | OUTPATIENT
Start: 2020-03-10 | End: 2020-03-11

## 2020-03-10 RX ORDER — OXYTOCIN 10 UNIT/ML
1 VIAL (ML) INJECTION
Qty: 30 | Refills: 0 | Status: DISCONTINUED | OUTPATIENT
Start: 2020-03-10 | End: 2020-03-11

## 2020-03-10 RX ORDER — PRAMOXINE HYDROCHLORIDE 150 MG/15G
1 AEROSOL, FOAM RECTAL EVERY 4 HOURS
Refills: 0 | Status: DISCONTINUED | OUTPATIENT
Start: 2020-03-10 | End: 2020-03-11

## 2020-03-10 RX ORDER — OXYCODONE HYDROCHLORIDE 5 MG/1
5 TABLET ORAL ONCE
Refills: 0 | Status: DISCONTINUED | OUTPATIENT
Start: 2020-03-10 | End: 2020-03-11

## 2020-03-10 RX ORDER — OXYTOCIN 10 UNIT/ML
333.33 VIAL (ML) INJECTION
Qty: 20 | Refills: 0 | Status: COMPLETED | OUTPATIENT
Start: 2020-03-10 | End: 2020-03-10

## 2020-03-10 RX ORDER — SODIUM CHLORIDE 9 MG/ML
3 INJECTION INTRAMUSCULAR; INTRAVENOUS; SUBCUTANEOUS EVERY 8 HOURS
Refills: 0 | Status: DISCONTINUED | OUTPATIENT
Start: 2020-03-10 | End: 2020-03-11

## 2020-03-10 RX ORDER — OXYCODONE HYDROCHLORIDE 5 MG/1
5 TABLET ORAL
Refills: 0 | Status: DISCONTINUED | OUTPATIENT
Start: 2020-03-10 | End: 2020-03-11

## 2020-03-10 RX ORDER — AER TRAVELER 0.5 G/1
1 SOLUTION RECTAL; TOPICAL EVERY 4 HOURS
Refills: 0 | Status: DISCONTINUED | OUTPATIENT
Start: 2020-03-10 | End: 2020-03-11

## 2020-03-10 RX ORDER — AMPICILLIN TRIHYDRATE 250 MG
2 CAPSULE ORAL ONCE
Refills: 0 | Status: COMPLETED | OUTPATIENT
Start: 2020-03-10 | End: 2020-03-10

## 2020-03-10 RX ORDER — IBUPROFEN 200 MG
600 TABLET ORAL EVERY 6 HOURS
Refills: 0 | Status: COMPLETED | OUTPATIENT
Start: 2020-03-10 | End: 2021-02-06

## 2020-03-10 RX ORDER — KETOROLAC TROMETHAMINE 30 MG/ML
30 SYRINGE (ML) INJECTION ONCE
Refills: 0 | Status: DISCONTINUED | OUTPATIENT
Start: 2020-03-10 | End: 2020-03-10

## 2020-03-10 RX ORDER — IBUPROFEN 200 MG
600 TABLET ORAL EVERY 6 HOURS
Refills: 0 | Status: DISCONTINUED | OUTPATIENT
Start: 2020-03-10 | End: 2020-03-11

## 2020-03-10 RX ORDER — ACETAMINOPHEN 500 MG
975 TABLET ORAL
Refills: 0 | Status: DISCONTINUED | OUTPATIENT
Start: 2020-03-10 | End: 2020-03-11

## 2020-03-10 RX ORDER — BENZOCAINE 10 %
1 GEL (GRAM) MUCOUS MEMBRANE EVERY 6 HOURS
Refills: 0 | Status: DISCONTINUED | OUTPATIENT
Start: 2020-03-10 | End: 2020-03-11

## 2020-03-10 RX ORDER — DIPHENHYDRAMINE HCL 50 MG
25 CAPSULE ORAL EVERY 6 HOURS
Refills: 0 | Status: DISCONTINUED | OUTPATIENT
Start: 2020-03-10 | End: 2020-03-11

## 2020-03-10 RX ORDER — AMPICILLIN TRIHYDRATE 250 MG
1 CAPSULE ORAL EVERY 4 HOURS
Refills: 0 | Status: DISCONTINUED | OUTPATIENT
Start: 2020-03-10 | End: 2020-03-10

## 2020-03-10 RX ORDER — GLYCERIN ADULT
1 SUPPOSITORY, RECTAL RECTAL AT BEDTIME
Refills: 0 | Status: DISCONTINUED | OUTPATIENT
Start: 2020-03-10 | End: 2020-03-11

## 2020-03-10 RX ORDER — TETANUS TOXOID, REDUCED DIPHTHERIA TOXOID AND ACELLULAR PERTUSSIS VACCINE, ADSORBED 5; 2.5; 8; 8; 2.5 [IU]/.5ML; [IU]/.5ML; UG/.5ML; UG/.5ML; UG/.5ML
0.5 SUSPENSION INTRAMUSCULAR ONCE
Refills: 0 | Status: DISCONTINUED | OUTPATIENT
Start: 2020-03-10 | End: 2020-03-11

## 2020-03-10 RX ORDER — SODIUM CHLORIDE 9 MG/ML
1000 INJECTION, SOLUTION INTRAVENOUS
Refills: 0 | Status: DISCONTINUED | OUTPATIENT
Start: 2020-03-10 | End: 2020-03-10

## 2020-03-10 RX ORDER — HYDROCORTISONE 1 %
1 OINTMENT (GRAM) TOPICAL EVERY 6 HOURS
Refills: 0 | Status: DISCONTINUED | OUTPATIENT
Start: 2020-03-10 | End: 2020-03-11

## 2020-03-10 RX ORDER — DIBUCAINE 1 %
1 OINTMENT (GRAM) RECTAL EVERY 6 HOURS
Refills: 0 | Status: DISCONTINUED | OUTPATIENT
Start: 2020-03-10 | End: 2020-03-11

## 2020-03-10 RX ORDER — MAGNESIUM HYDROXIDE 400 MG/1
30 TABLET, CHEWABLE ORAL
Refills: 0 | Status: DISCONTINUED | OUTPATIENT
Start: 2020-03-10 | End: 2020-03-11

## 2020-03-10 RX ADMIN — Medication 975 MILLIGRAM(S): at 21:28

## 2020-03-10 RX ADMIN — Medication 108 GRAM(S): at 11:15

## 2020-03-10 RX ADMIN — Medication 216 GRAM(S): at 07:15

## 2020-03-10 RX ADMIN — Medication 1000 MILLIUNIT(S)/MIN: at 14:44

## 2020-03-10 RX ADMIN — Medication 1 MILLIUNIT(S)/MIN: at 09:03

## 2020-03-10 RX ADMIN — Medication 600 MILLIGRAM(S): at 18:00

## 2020-03-10 RX ADMIN — Medication 600 MILLIGRAM(S): at 18:35

## 2020-03-10 RX ADMIN — SODIUM CHLORIDE 3 MILLILITER(S): 9 INJECTION INTRAMUSCULAR; INTRAVENOUS; SUBCUTANEOUS at 20:13

## 2020-03-10 RX ADMIN — Medication 30 MILLIGRAM(S): at 14:43

## 2020-03-10 RX ADMIN — Medication 975 MILLIGRAM(S): at 21:58

## 2020-03-10 NOTE — OB PROVIDER H&P - ASSESSMENT
A/P: 30yo  at 40w2d GA, Rh positive, GBS positive, HSV II on Valtrex, IOL for postdates and polyhydramnios   -admit to labor and delivery  -pain management prn   -continous efm & toco  -admission labs  -IV hydration   -GBS prophylaxis: 2g ampicillin now and then 1g d3ciuro  -induction: cervical romero balloon     Dr. Cannon and Dr. Brandon to be made aware A/P: 28yo  at 40w2d GA, Rh positive, GBS positive, HSV II on Valtrex, IOL for postdates and polyhydramnios   -admit to labor and delivery  -pain management prn   -continous efm & toco  -admission labs  -IV hydration   -GBS prophylaxis: 2g ampicillin now and then 1g [id number]  -induction: cervical romero balloon     Dr. Cannon and Dr. Brandon aware

## 2020-03-10 NOTE — PROGRESS NOTE ADULT - ASSESSMENT
28 yo , GBS pos, HSVII on valtrex, h/o c/s x1 w/ VBACx3, for TOLAC, IOL for post dates, s/p cervical balloon, AROM, on pitocin, doing well.    - pain management PRN  - clear liquid diet, IV hydration  - monitor vitals  - cont EFM and toco  - c/w pitocin  - f/up UDS    Dr. Hernandez and Dr Culp aware.

## 2020-03-10 NOTE — OB PROVIDER DELIVERY SUMMARY - NSPROVIDERDELIVERYNOTE_OBGYN_ALL_OB_FT
Patient was fully dilated and pushing. Fetal head was OA and restituted to ROT. The anterior and posterior shoulders delivered, followed by the remaining body atraumatically. Delayed cord clamping was performed, and then clamped and cut. Cord blood gases collected x2. The  was handed to the mother and RN. The placenta delivered intact with membranes. Pitocin was administered without additional interventions. Uterus massaged, fundus found to be firm. Cervix, vagina and perineum inspected with no lacerations noted.    Viable male infant delivered, weighing 3880 grams, with APGARs 9/9    Laceration: none  EBL 200cc    Dr. Culp present for the delivery

## 2020-03-10 NOTE — OB PROVIDER H&P - NS_OBGYNHISTORY_OBGYN_ALL_OB_FT
OB Hx: , FT LTCS x1 for IUGR, then VBACx3 largest 8lb  GynHx: H/o genital HSV last outbreak 2 years ago, denies h/o cysts, fibroids, abnormal paps, other STIs

## 2020-03-10 NOTE — PROGRESS NOTE ADULT - SUBJECTIVE AND OBJECTIVE BOX
PGY 2 Note    S: Pt see and examined at bedside. Doing well, experiencing pain with contractions but does not desire epidural at this time.     Vital Signs Last 24 Hrs  T(F): 98.6 (10 Mar 2020 09:28), Max: 102.2 (10 Mar 2020 03:52)  HR: 85 (10 Mar 2020 11:03) (78 - 103)  BP: 126/80 (10 Mar 2020 11:03) (102/57 - 126/80)  RR: 18 (10 Mar 2020 09:28) (16 - 18)    EFM: 120/mod rodolfo/+accel  TOCO: q3-6min  SVE: 5/50/-2, vtx, AROM copious clear    Labs:                        11.9   12.94 )-----------( 239      ( 10 Mar 2020 00:50 )             35.2             ABO RH Interpretation: B POS (03-10-20 @ 00:50)    Urinalysis Basic - ( 10 Mar 2020 00:50 )    Color: Light Yellow / Appearance: Slightly Turbid / S.015 / pH: x  Gluc: x / Ketone: Negative  / Bili: Negative / Urobili: <2 mg/dL   Blood: x / Protein: Negative / Nitrite: Negative   Leuk Esterase: Small / RBC: 4 /HPF / WBC 12 /HPF   Sq Epi: x / Non Sq Epi: 4 /HPF / Bacteria: Moderate        Prenatal Syphilis Test: Nonreact (03-10-20 @ 00:50)      Meds:  @0135 balloon inserted, removed @0130  @0903 pit started, now @3mu/min

## 2020-03-10 NOTE — PROGRESS NOTE ADULT - SUBJECTIVE AND OBJECTIVE BOX
Pt evaluated at bedside, comfortable at this time.     T(C): 37.1 (03-10-20 @ 06:14), Max: 39.0 (03-10-20 @ 03:52)  HR: 90 (03-10-20 @ 07:46) (82 - 103)  BP: 120/73 (03-10-20 @ 07:46) (102/57 - 123/58)  RR: 18 (03-10-20 @ 06:14) (16 - 18)    VE: balloon removed  /-3  Bedside sono: Vertex  Ctx: q 5-6 min  FHR: 125 moderate variablity, Cat I                          11.9   12.94 )-----------( 239      ( 10 Mar 2020 00:50 )             35.2     A/P: 29y.o.  @ 40.2wks IOL for post-edc, s/p cervical balloon, previous  x 1 w/  x 3, GBS positive  - Continuous efm and toco  - Will start pitocin augmentation  - Pain management PRN  Dr. Culp aware

## 2020-03-10 NOTE — PROGRESS NOTE ADULT - ASSESSMENT
A/P:   28yo  at 40w2d, GBS positive, HSV II on Valtrex, IOL for postdates and polyhydramnios, s/p balloon, on pitocin   -continue pitocin 1x1  -ampicillin for GBS prophylaxis  -pain management prn  -cont efm/toco  -f/u UDS  -cont to monitor vitals  -cont iv hydration    Dr. Lau and Dr. Culp aware.

## 2020-03-10 NOTE — OB PROVIDER H&P - NSHPPHYSICALEXAM_GEN_ALL_CORE
Vital Signs Last 24 Hrs  T(F): 98.1 (10 Mar 2020 00:25), Max: 98.1 (10 Mar 2020 00:25)  HR: 88 (10 Mar 2020 00:35) (88 - 88)  BP: 119/77 (10 Mar 2020 00:35) (119/77 - 119/77)  RR: 16 (10 Mar 2020 00:25) (16 - 16)    Physical Exam:  GA: AOX3, no apparent distress  Resp: CTAB  Cardio: RRR  Abd: soft, nontender, no palpable ctx, gravid  Extr: no erythema or pain to palpation bilaterally   SVE:   Speculum: no lesions noted     EFM: 120bpm/moderate variability/+accels   [place]: irregular  BSS: cephalic, MVP-8.15cm Vital Signs Last 24 Hrs  T(F): 98.1 (10 Mar 2020 00:25), Max: 98.1 (10 Mar 2020 00:25)  HR: 88 (10 Mar 2020 00:35) (88 - 88)  BP: 119/77 (10 Mar 2020 00:35) (119/77 - 119/77)  RR: 16 (10 Mar 2020 00:25) (16 - 16)    Physical Exam:  GA: AOX3, no apparent distress  Resp: CTAB  Cardio: RRR  Abd: soft, nontender, no palpable ctx, gravid  Extr: no erythema or pain to palpation bilaterally   SVE: 1/L/-3, vtx, intact   Speculum: no lesions noted     EFM: 120bpm/moderate variability/+accels   [place]: irregular  BSS: cephalic, MVP-8.15cm

## 2020-03-10 NOTE — OB PROVIDER H&P - NSHPLABSRESULTS_GEN_ALL_CORE
Labs:  Prenatal Labs:  2/10/20  GBS: pos  GC/CT: neg  HIV: NR    12/9/19  GCT: 101    8/12/20  blood type: B pos  Ab screen: neg  HepB: neg  HIV: NR  RPR: neg  varicella: immune	  rubella: immune  measles: immune  lead: <1  hgn electrophoresis: nml  CF: neg  SMA: neg  fragile x: neg  seq1/2: low risk    Sonos:  13w3d: SIUP, +FHR, nt 1.51wnl, 2.5cm left cystic mass  20w3d: EFW 361gms (52%), ceph, post placenta, MVP 5.28cm, cvx 4.7cm, nml anatomy but limited   22w5d: breech, ant placenta, nml anatomy  33w4d: EFW 2663gms (89%), ceph, post placenta, MVP 9.42cm, BPP 8/8	  34w3d: ceph, post placenta, MVP 6cm, BPP 8/8  35w3d: ceph, post placenta, MVP 8.2cm, BPP 8/8  36w3d: ceph, post placenta, MVP 8.24cm, BPP 8/8  39w3d: EFW-3698g (65%), cephalic, posterior placenta, MVP-7.04cm

## 2020-03-10 NOTE — PROGRESS NOTE ADULT - ASSESSMENT
A/P:  28yo , GBS pos, HSVII on valtrex, h/o c/s x1 w/ VBACx3, for TOLAC, IOL for post dates, s/p cervical balloon, AROM, on pitocin, doing well  -cont EFM/toco  -clear liquid diet, IVF  -pain management prn  -cont pitocin  -cont ampicillin for GBS prophylaxis    Dr. Culp and Dr. Lau aware

## 2020-03-10 NOTE — PROGRESS NOTE ADULT - SUBJECTIVE AND OBJECTIVE BOX
PGY 1 Note    Patient seen at bedside for evaluation of labor progression.  S/p AROM clear @1100.  Does not desire pain management at this time     Vital Signs Last 24 Hrs  T(C): 37.0 (10 Mar 2020 12:38), Max: 39.0 (10 Mar 2020 03:52)  T(F): 98.6 (10 Mar 2020 12:38), Max: 102.2 (10 Mar 2020 03:52)  HR: 86 (10 Mar 2020 13:02) (78 - 103)  BP: 119/75 (10 Mar 2020 13:02) (102/57 - 126/80)  RR: 18 (10 Mar 2020 12:38) (16 - 18)    EFM: 120/mod/+accel, cat I  TOCO: q2-4m  SVE: 5/50/-2, ruptured    Labs:                        11.9   12.94 )-----------( 239      ( 10 Mar 2020 00:50 )             35.2         UDS neg  RPR NR  ABO RH Interpretation: B POS (03-10-20 @ 00:50)  antibody neg    Urinalysis Basic - ( 10 Mar 2020 00:50 )    Color: Light Yellow / Appearance: Slightly Turbid / S.015 / pH: x  Gluc: x / Ketone: Negative  / Bili: Negative / Urobili: <2 mg/dL   Blood: x / Protein: Negative / Nitrite: Negative   Leuk Esterase: Small / RBC: 4 /HPF / WBC 12 /HPF   Sq Epi: x / Non Sq Epi: 4 /HPF / Bacteria: Moderate      Meds: ampicillin  IVPB 1 Gram(s) IV Intermittent every 4 hour, last dose @1115  oxytocin Infusion 1 milliUNIT(s)/Min IV Continuous <Continuous>, started @0906, now at 7mu/min

## 2020-03-10 NOTE — OB PROVIDER H&P - HISTORY OF PRESENT ILLNESS
30yo  at 40w2d GA dated by LMP c/w 1st tri sono presenting to L&D for IOL for post-dates and polyhydramnios. Pt reports good fetal movement. Reports occasional contractions with no frequency. Denies VB or LOF. H/o genital HSV, last outbreak 2 years ago, currently on Valtrex. Denies active lesions or prodromal symptoms. GBS pos. H/o c/s with VBACx3, desires TOLAC. H/o polyhydramnios, MVP on  was 9.9cm.

## 2020-03-10 NOTE — PROGRESS NOTE ADULT - SUBJECTIVE AND OBJECTIVE BOX
PGY1 Note    Patient seen at bedside for evaluation of labor progression, doing well, no complaints.     T(F): 98.6 (03-10-20 @ 09:28), Max: 102.2 (03-10-20 @ 03:52)  HR: 78 (03-10-20 @ 10:02) (78 - 103)  BP: 107/74 (03-10-20 @ 10:02) (102/57 - 123/58)  RR: 18 (03-10-20 @ 09:28) (16 - 18)    EFM: 130/mod/+accels; cat 1  TOCO: q 5 min  SVE: 4/60/-3 vtx intact @0730     Medications:  pitocin 1x1 started at 0903  ampicillin @0715  balloon in @0135, out @0730      Labs:                        11.9   12.94 )-----------( 239      ( 10 Mar 2020 00:50 )             35.2           ABO RH Interpretation: B POS (03-10-20 @ 00:50)    Antibody Screen: NEG (03-10-20 @ 00:50)    Urinalysis Basic - ( 10 Mar 2020 00:50 )    Color: Light Yellow / Appearance: Slightly Turbid / S.015 / pH: x  Gluc: x / Ketone: Negative  / Bili: Negative / Urobili: <2 mg/dL   Blood: x / Protein: Negative / Nitrite: Negative   Leuk Esterase: Small / RBC: 4 /HPF / WBC 12 /HPF   Sq Epi: x / Non Sq Epi: 4 /HPF / Bacteria: Moderate        Prenatal Syphilis Test: Nonreact (03-10-20 @ 00:50)

## 2020-03-10 NOTE — OB RN DELIVERY SUMMARY - NS_GENERALBABYACOMMENTA_OBGYN_ALL_OB_FT
grunted noted after birth huseyin olguin and lorie present to baby; grunting resolved heal stick 47 baby given to mom to breastfeed

## 2020-03-11 ENCOUNTER — TRANSCRIPTION ENCOUNTER (OUTPATIENT)
Age: 30
End: 2020-03-11

## 2020-03-11 ENCOUNTER — APPOINTMENT (OUTPATIENT)
Dept: ANTEPARTUM | Facility: CLINIC | Age: 30
End: 2020-03-11

## 2020-03-11 VITALS
HEART RATE: 72 BPM | DIASTOLIC BLOOD PRESSURE: 64 MMHG | RESPIRATION RATE: 18 BRPM | SYSTOLIC BLOOD PRESSURE: 117 MMHG | TEMPERATURE: 97 F

## 2020-03-11 LAB
BASOPHILS # BLD AUTO: 0.04 K/UL — SIGNIFICANT CHANGE UP (ref 0–0.2)
BASOPHILS NFR BLD AUTO: 0.3 % — SIGNIFICANT CHANGE UP (ref 0–1)
EOSINOPHIL # BLD AUTO: 0.19 K/UL — SIGNIFICANT CHANGE UP (ref 0–0.7)
EOSINOPHIL NFR BLD AUTO: 1.5 % — SIGNIFICANT CHANGE UP (ref 0–8)
HCT VFR BLD CALC: 36.3 % — LOW (ref 37–47)
HGB BLD-MCNC: 11.7 G/DL — LOW (ref 12–16)
IMM GRANULOCYTES NFR BLD AUTO: 2.2 % — HIGH (ref 0.1–0.3)
LYMPHOCYTES # BLD AUTO: 1.79 K/UL — SIGNIFICANT CHANGE UP (ref 1.2–3.4)
LYMPHOCYTES # BLD AUTO: 13.8 % — LOW (ref 20.5–51.1)
MCHC RBC-ENTMCNC: 29.5 PG — SIGNIFICANT CHANGE UP (ref 27–31)
MCHC RBC-ENTMCNC: 32.2 G/DL — SIGNIFICANT CHANGE UP (ref 32–37)
MCV RBC AUTO: 91.4 FL — SIGNIFICANT CHANGE UP (ref 81–99)
MONOCYTES # BLD AUTO: 1.25 K/UL — HIGH (ref 0.1–0.6)
MONOCYTES NFR BLD AUTO: 9.6 % — HIGH (ref 1.7–9.3)
NEUTROPHILS # BLD AUTO: 9.44 K/UL — HIGH (ref 1.4–6.5)
NEUTROPHILS NFR BLD AUTO: 72.6 % — SIGNIFICANT CHANGE UP (ref 42.2–75.2)
NRBC # BLD: 0 /100 WBCS — SIGNIFICANT CHANGE UP (ref 0–0)
PLATELET # BLD AUTO: 213 K/UL — SIGNIFICANT CHANGE UP (ref 130–400)
RBC # BLD: 3.97 M/UL — LOW (ref 4.2–5.4)
RBC # FLD: 14.3 % — SIGNIFICANT CHANGE UP (ref 11.5–14.5)
WBC # BLD: 13 K/UL — HIGH (ref 4.8–10.8)
WBC # FLD AUTO: 13 K/UL — HIGH (ref 4.8–10.8)

## 2020-03-11 RX ORDER — SIMETHICONE 80 MG/1
1 TABLET, CHEWABLE ORAL
Qty: 0 | Refills: 0 | DISCHARGE
Start: 2020-03-11

## 2020-03-11 RX ORDER — IBUPROFEN 200 MG
1 TABLET ORAL
Qty: 0 | Refills: 0 | DISCHARGE
Start: 2020-03-11

## 2020-03-11 RX ORDER — ACETAMINOPHEN 500 MG
3 TABLET ORAL
Qty: 0 | Refills: 0 | DISCHARGE
Start: 2020-03-11

## 2020-03-11 RX ADMIN — Medication 975 MILLIGRAM(S): at 08:25

## 2020-03-11 RX ADMIN — Medication 975 MILLIGRAM(S): at 03:40

## 2020-03-11 RX ADMIN — SODIUM CHLORIDE 3 MILLILITER(S): 9 INJECTION INTRAMUSCULAR; INTRAVENOUS; SUBCUTANEOUS at 16:23

## 2020-03-11 RX ADMIN — Medication 600 MILLIGRAM(S): at 12:03

## 2020-03-11 RX ADMIN — Medication 600 MILLIGRAM(S): at 00:40

## 2020-03-11 RX ADMIN — Medication 975 MILLIGRAM(S): at 08:55

## 2020-03-11 RX ADMIN — Medication 975 MILLIGRAM(S): at 03:08

## 2020-03-11 RX ADMIN — Medication 600 MILLIGRAM(S): at 06:21

## 2020-03-11 RX ADMIN — Medication 975 MILLIGRAM(S): at 21:12

## 2020-03-11 RX ADMIN — Medication 600 MILLIGRAM(S): at 11:33

## 2020-03-11 RX ADMIN — Medication 1 TABLET(S): at 11:34

## 2020-03-11 RX ADMIN — Medication 975 MILLIGRAM(S): at 20:29

## 2020-03-11 RX ADMIN — Medication 600 MILLIGRAM(S): at 00:10

## 2020-03-11 RX ADMIN — Medication 975 MILLIGRAM(S): at 14:39

## 2020-03-11 RX ADMIN — Medication 600 MILLIGRAM(S): at 18:30

## 2020-03-11 RX ADMIN — Medication 975 MILLIGRAM(S): at 16:23

## 2020-03-11 RX ADMIN — Medication 600 MILLIGRAM(S): at 17:53

## 2020-03-11 NOTE — DISCHARGE NOTE OB - CARE PLAN
Principal Discharge DX:	Vaginal delivery following previous  section, delivered  Goal:	happy and healthy mother and baby  Assessment and plan of treatment:	Nothing in the vagina for 6 weeks (no sex, no tampons, no douching). Avoid tub baths, you may shower.    If you have a fever of 100.4F or greater, severe vaginal bleeding, or severe abdominal pain, call your Ob/Gyn or come to the emergency department immediately.

## 2020-03-11 NOTE — PROGRESS NOTE ADULT - ASSESSMENT
28 yo now P5, s/p , PPD1, recovering well.    - pain management PRN  - regular diet, PO hydration  -monitor vitals, bleeding  - encourage ambulation  - f/up AM CBC  - anticipate d/c today    dr. Kearney to be made aware.

## 2020-03-11 NOTE — DISCHARGE NOTE OB - CARE PROVIDER_API CALL
Kelly Culp)  Obstetrics and Gynecology  [address] [place] [id number]  Phone: [phone]  Fax: [phone]  Follow Up Time:

## 2020-03-11 NOTE — DISCHARGE NOTE OB - PATIENT PORTAL LINK FT
You can access the FollowMyHealth Patient Portal offered by [facility] by registering at the following website: [email or web address]. By joining [facility]’s FollowMyHealth portal, you will also be able to view your health information using other applications (apps) compatible with our system.

## 2020-03-11 NOTE — PROGRESS NOTE ADULT - SUBJECTIVE AND OBJECTIVE BOX
PGY 2 Post Partum note    Subjective: Pt seen and examined at bedside. doing well, no complaints or acute events overnight, pain controlled. pt is voiding without difficultly, passing flatus no BM, tolerating regular diet, ambulating, breast feeding. denies fever, chills, cp, sob, n/v, le pain.    Physical exam:    Vital Signs Last 24 Hrs  T(F): 98.8 (10 Mar 2020 23:34), Max: 98.8 (10 Mar 2020 23:34)  HR: 97 (10 Mar 2020 23:34) (75 - 97)  BP: 129/74 (10 Mar 2020 23:34) (105/67 - 131/85)  RR: 20 (10 Mar 2020 23:34) (18 - 20)  SpO2: 96% (10 Mar 2020 14:58) (95% - 96%)    Gen: NAD  CVS: s1s2, rrr  Lungs: ctab, no r/r/w  Abdomen: Soft, appropriately tender, no distension , firm uterine fundus below umbilicus, +BS  Pelvic: Normal lochia rubra noted  Ext: No calf tenderness    Diet: REgular  meds:    acetaminophen   Tablet ..   975 milliGRAM(s) Oral (03-11-20 @ 03:08)   975 milliGRAM(s) Oral (03-10-20 @ 21:28)    ampicillin  IVPB   108 mL/Hr IV Intermittent (03-10-20 @ 11:15)    ibuprofen  Tablet.   600 milliGRAM(s) Oral (03-11-20 @ 06:21)   600 milliGRAM(s) Oral (03-11-20 @ 00:10)   600 milliGRAM(s) Oral (03-10-20 @ 18:00)    ketorolac   Injectable   30 milliGRAM(s) IV Push (03-10-20 @ 14:43)    oxytocin Infusion   1 mL/Hr IV Continuous (03-10-20 @ 08:57)    sodium chloride 0.9% lock flush   3 milliLiter(s) IV Push (03-10-20 @ 20:13)        LABS:                        11.7   13.00 )-----------( 213      ( 11 Mar 2020 06:27 )             36.3                         11.9   12.94 )-----------( 239      ( 10 Mar 2020 00:50 )             35.2

## 2020-03-11 NOTE — DISCHARGE NOTE OB - MATERIALS PROVIDED
[facility] Hearing Screen Program/Birth Certificate Instructions/Back To Sleep Handout/Vaccinations/Guide to Postpartum Care/[facility]  Screening Program

## 2020-03-11 NOTE — DISCHARGE NOTE OB - NSCORESITESY/N_GEN_A_CORE_RD
Telephone Encounter by Ann Sanford RN at 05/17/17 07:30 AM     Author:  Ann Sanford, RN Service:  (none) Author Type:  Registered Nurse     Filed:  05/17/17 07:30 AM Encounter Date:  5/16/2017 Status:  Signed     :  Ann Sanford RN (Registered Nurse)       From: Fabienne Chamorro  To: Abhay Arellano MD, PhD  Sent: 5/16/2017  4:31 PM CDT  Subject: Lab Tests  Test Related Questions    Hi , I had an ultrasound test last  Monday and wondered if you had   received the results. Fabienne       Revision History        Date/Time User Provider Type Action    > 05/17/17 07:30 AM Ann Sanford, RN Registered Nurse Sign    Attribution information within the note text is not available.            
Telephone Encounter by Pascale Trujillo RN at 05/17/17 09:29 AM     Author:  Pascale Trujillo RN Service:  (none) Author Type:  Registered Nurse     Filed:  05/17/17 09:30 AM Encounter Date:  5/16/2017 Status:  Signed     :  Pascale Trujillo RN (Registered Nurse)            Forward to Dr. Arellano  Patient asking regarding results of duplex scan done on 5/9/17.[LM1.1M]      Revision History        User Key Date/Time User Provider Type Action    > LM1.1 05/17/17 09:30 AM Pascale Trujillo RN Registered Nurse Sign    M - Manual            
No

## 2020-03-16 DIAGNOSIS — O48.0 POST-TERM PREGNANCY: ICD-10-CM

## 2020-03-16 DIAGNOSIS — E28.2 POLYCYSTIC OVARIAN SYNDROME: ICD-10-CM

## 2020-03-16 DIAGNOSIS — Z22.4 CARRIER OF INFECTIONS WITH A PREDOMINANTLY SEXUAL MODE OF TRANSMISSION: ICD-10-CM

## 2020-03-16 DIAGNOSIS — O34.211 MATERNAL CARE FOR LOW TRANSVERSE SCAR FROM PREVIOUS CESAREAN DELIVERY: ICD-10-CM

## 2020-03-16 DIAGNOSIS — O40.3XX0 POLYHYDRAMNIOS, THIRD TRIMESTER, NOT APPLICABLE OR UNSPECIFIED: ICD-10-CM

## 2020-03-16 DIAGNOSIS — Z3A.40 40 WEEKS GESTATION OF PREGNANCY: ICD-10-CM

## 2020-05-04 ENCOUNTER — APPOINTMENT (OUTPATIENT)
Dept: OBGYN | Facility: CLINIC | Age: 30
End: 2020-05-04

## 2020-05-14 ENCOUNTER — APPOINTMENT (OUTPATIENT)
Dept: OBGYN | Facility: CLINIC | Age: 30
End: 2020-05-14
Payer: OTHER GOVERNMENT

## 2020-05-14 ENCOUNTER — APPOINTMENT (OUTPATIENT)
Dept: OBGYN | Facility: CLINIC | Age: 30
End: 2020-05-14

## 2020-05-14 ENCOUNTER — NON-APPOINTMENT (OUTPATIENT)
Age: 30
End: 2020-05-14

## 2020-05-14 ENCOUNTER — OUTPATIENT (OUTPATIENT)
Dept: OUTPATIENT SERVICES | Facility: HOSPITAL | Age: 30
LOS: 1 days | Discharge: HOME | End: 2020-05-14

## 2020-05-14 DIAGNOSIS — N81.0 URETHROCELE: Chronic | ICD-10-CM

## 2020-05-14 DIAGNOSIS — Z98.891 HISTORY OF UTERINE SCAR FROM PREVIOUS SURGERY: Chronic | ICD-10-CM

## 2020-05-14 DIAGNOSIS — O40.3XX0 POLYHYDRAMNIOS, THIRD TRIMESTER, NOT APPLICABLE OR UNSPECIFIED: ICD-10-CM

## 2020-05-14 DIAGNOSIS — O99.210 OBESITY COMPLICATING PREGNANCY, UNSPECIFIED TRIMESTER: ICD-10-CM

## 2020-05-14 PROCEDURE — 99213 OFFICE O/P EST LOW 20 MIN: CPT | Mod: 95

## 2020-05-14 NOTE — HISTORY OF PRESENT ILLNESS
[Home] : at home, [unfilled] , at the time of the visit. [Other Location: e.g. Home (Enter Location, City,State)___] : at [unfilled] [Other:____] : [unfilled] [Patient] : the patient [Self] : self [Postpartum Follow Up] : postpartum follow up [Delivery Date: ___] : on [unfilled] [] : delivered by vaginal delivery [Male] : Delivery History: baby boy [Wt. ___] : weighing [unfilled] [Breastfeeding] : currently nursing [Cracked Nipples] : cracked nipples [None] : No associated symptoms are reported [Breast Nipple Reddened And Excoriated Left] : nipple inflammation noted [Doing Well] : is doing well [Resumed [place]] : has not resumed intercourse [Resumed Menses] : has not resumed her menses [Intended Contraception] : the patient does not intended to use contraception postpartum [FreeTextEntry9] : Polyhydramnios [de-identified] : Thrush on nipple being treated by babies pediatrician. Pt also complaining of worstening hernia [de-identified] : normal by patient [de-identified] : Routine post partum  [de-identified] : Pt referred to Dr Tom Alaniz for evaluation of hernia. Pt to rv when completed breast feeding for possible IUD insertion

## 2023-07-27 NOTE — OB PROVIDER TRIAGE NOTE - NS_CHIEFCOMPLAINT_OBGYN_ALL_OB
Possible SROM Graft Donor Site Bandage (Optional-Leave Blank If You Don't Want In Note): Steri-strips and a pressure bandage were applied to the donor site.

## 2024-05-10 NOTE — DISCHARGE NOTE OB - AVOID DOUCHING OR TAMPONS UNTIL YOUR POSTPARTUM VISIT
This patient is scheduled for an ultrasound at [facility]. The order entered for the ultrasound is incorrect per department protocol. Please put in a new order with the providers signature or awaiting co-signature. Please use order [id number] If you have any questions, please call our department at [phone] Thank you.    Statement Selected

## 2024-10-17 NOTE — OB RN TRIAGE NOTE - NS_VISITREASON1_OBGYN_ALL_OB
Medication: METOPROLOL ER SUCCINATE 25MG TABS   Medication refill denied. Refills are on file at pharmacy.    Disp Refills Start End    metoPROLOL succinate (TOPROL-XL) 25 MG 24 hr tablet 30 tablet 1 10/14/2024      
Rupture of Membranes
normal for race